# Patient Record
Sex: MALE | Race: WHITE | NOT HISPANIC OR LATINO
[De-identification: names, ages, dates, MRNs, and addresses within clinical notes are randomized per-mention and may not be internally consistent; named-entity substitution may affect disease eponyms.]

---

## 2017-06-08 ENCOUNTER — MEDICATION RENEWAL (OUTPATIENT)
Age: 52
End: 2017-06-08

## 2018-01-29 ENCOUNTER — APPOINTMENT (OUTPATIENT)
Dept: SURGERY | Facility: CLINIC | Age: 53
End: 2018-01-29
Payer: MEDICARE

## 2018-01-29 VITALS
HEART RATE: 85 BPM | HEIGHT: 67 IN | TEMPERATURE: 98 F | SYSTOLIC BLOOD PRESSURE: 123 MMHG | WEIGHT: 213 LBS | OXYGEN SATURATION: 95 % | RESPIRATION RATE: 18 BRPM | BODY MASS INDEX: 33.43 KG/M2 | DIASTOLIC BLOOD PRESSURE: 88 MMHG

## 2018-01-29 DIAGNOSIS — Z82.49 FAMILY HISTORY OF ISCHEMIC HEART DISEASE AND OTHER DISEASES OF THE CIRCULATORY SYSTEM: ICD-10-CM

## 2018-01-29 DIAGNOSIS — L72.0 EPIDERMAL CYST: ICD-10-CM

## 2018-01-29 PROCEDURE — 99203 OFFICE O/P NEW LOW 30 MIN: CPT

## 2019-07-08 ENCOUNTER — EMERGENCY (EMERGENCY)
Facility: HOSPITAL | Age: 54
LOS: 1 days | Discharge: HOME | End: 2019-07-08
Attending: EMERGENCY MEDICINE
Payer: MEDICARE

## 2019-07-08 VITALS
SYSTOLIC BLOOD PRESSURE: 138 MMHG | RESPIRATION RATE: 18 BRPM | HEART RATE: 71 BPM | OXYGEN SATURATION: 95 % | DIASTOLIC BLOOD PRESSURE: 88 MMHG | TEMPERATURE: 98 F | HEIGHT: 67 IN | WEIGHT: 210.1 LBS

## 2019-07-08 DIAGNOSIS — Z98.89 OTHER SPECIFIED POSTPROCEDURAL STATES: Chronic | ICD-10-CM

## 2019-07-08 LAB
ALBUMIN SERPL ELPH-MCNC: 4.1 G/DL — SIGNIFICANT CHANGE UP (ref 3.3–5.2)
ALP SERPL-CCNC: 73 U/L — SIGNIFICANT CHANGE UP (ref 40–120)
ALT FLD-CCNC: 45 U/L — HIGH
ANION GAP SERPL CALC-SCNC: 13 MMOL/L — SIGNIFICANT CHANGE UP (ref 5–17)
AST SERPL-CCNC: 28 U/L — SIGNIFICANT CHANGE UP
BASOPHILS # BLD AUTO: 0.07 K/UL — SIGNIFICANT CHANGE UP (ref 0–0.2)
BASOPHILS NFR BLD AUTO: 1 % — SIGNIFICANT CHANGE UP (ref 0–2)
BILIRUB SERPL-MCNC: 0.5 MG/DL — SIGNIFICANT CHANGE UP (ref 0.4–2)
BUN SERPL-MCNC: 14 MG/DL — SIGNIFICANT CHANGE UP (ref 8–20)
CALCIUM SERPL-MCNC: 9.6 MG/DL — SIGNIFICANT CHANGE UP (ref 8.6–10.2)
CHLORIDE SERPL-SCNC: 101 MMOL/L — SIGNIFICANT CHANGE UP (ref 98–107)
CO2 SERPL-SCNC: 22 MMOL/L — SIGNIFICANT CHANGE UP (ref 22–29)
CREAT SERPL-MCNC: 0.85 MG/DL — SIGNIFICANT CHANGE UP (ref 0.5–1.3)
EOSINOPHIL # BLD AUTO: 0.32 K/UL — SIGNIFICANT CHANGE UP (ref 0–0.5)
EOSINOPHIL NFR BLD AUTO: 4.5 % — SIGNIFICANT CHANGE UP (ref 0–6)
GLUCOSE SERPL-MCNC: 130 MG/DL — HIGH (ref 70–115)
HCT VFR BLD CALC: 45.9 % — SIGNIFICANT CHANGE UP (ref 39–50)
HGB BLD-MCNC: 16.3 G/DL — SIGNIFICANT CHANGE UP (ref 13–17)
IMM GRANULOCYTES NFR BLD AUTO: 0.3 % — SIGNIFICANT CHANGE UP (ref 0–1.5)
LIDOCAIN IGE QN: 13 U/L — LOW (ref 22–51)
LYMPHOCYTES # BLD AUTO: 2.05 K/UL — SIGNIFICANT CHANGE UP (ref 1–3.3)
LYMPHOCYTES # BLD AUTO: 28.6 % — SIGNIFICANT CHANGE UP (ref 13–44)
MCHC RBC-ENTMCNC: 34 PG — SIGNIFICANT CHANGE UP (ref 27–34)
MCHC RBC-ENTMCNC: 35.5 GM/DL — SIGNIFICANT CHANGE UP (ref 32–36)
MCV RBC AUTO: 95.8 FL — SIGNIFICANT CHANGE UP (ref 80–100)
MONOCYTES # BLD AUTO: 0.52 K/UL — SIGNIFICANT CHANGE UP (ref 0–0.9)
MONOCYTES NFR BLD AUTO: 7.3 % — SIGNIFICANT CHANGE UP (ref 2–14)
NEUTROPHILS # BLD AUTO: 4.19 K/UL — SIGNIFICANT CHANGE UP (ref 1.8–7.4)
NEUTROPHILS NFR BLD AUTO: 58.3 % — SIGNIFICANT CHANGE UP (ref 43–77)
PLATELET # BLD AUTO: 316 K/UL — SIGNIFICANT CHANGE UP (ref 150–400)
POTASSIUM SERPL-MCNC: 4 MMOL/L — SIGNIFICANT CHANGE UP (ref 3.5–5.3)
POTASSIUM SERPL-SCNC: 4 MMOL/L — SIGNIFICANT CHANGE UP (ref 3.5–5.3)
PROT SERPL-MCNC: 7.4 G/DL — SIGNIFICANT CHANGE UP (ref 6.6–8.7)
RBC # BLD: 4.79 M/UL — SIGNIFICANT CHANGE UP (ref 4.2–5.8)
RBC # FLD: 12.4 % — SIGNIFICANT CHANGE UP (ref 10.3–14.5)
SODIUM SERPL-SCNC: 136 MMOL/L — SIGNIFICANT CHANGE UP (ref 135–145)
WBC # BLD: 7.17 K/UL — SIGNIFICANT CHANGE UP (ref 3.8–10.5)
WBC # FLD AUTO: 7.17 K/UL — SIGNIFICANT CHANGE UP (ref 3.8–10.5)

## 2019-07-08 PROCEDURE — 96361 HYDRATE IV INFUSION ADD-ON: CPT

## 2019-07-08 PROCEDURE — 96374 THER/PROPH/DIAG INJ IV PUSH: CPT | Mod: XU

## 2019-07-08 PROCEDURE — 85027 COMPLETE CBC AUTOMATED: CPT

## 2019-07-08 PROCEDURE — 74177 CT ABD & PELVIS W/CONTRAST: CPT | Mod: 26

## 2019-07-08 PROCEDURE — 80053 COMPREHEN METABOLIC PANEL: CPT

## 2019-07-08 PROCEDURE — 74177 CT ABD & PELVIS W/CONTRAST: CPT

## 2019-07-08 PROCEDURE — 99284 EMERGENCY DEPT VISIT MOD MDM: CPT | Mod: 25

## 2019-07-08 PROCEDURE — 99285 EMERGENCY DEPT VISIT HI MDM: CPT

## 2019-07-08 PROCEDURE — 36415 COLL VENOUS BLD VENIPUNCTURE: CPT

## 2019-07-08 PROCEDURE — 99053 MED SERV 10PM-8AM 24 HR FAC: CPT

## 2019-07-08 PROCEDURE — 96375 TX/PRO/DX INJ NEW DRUG ADDON: CPT

## 2019-07-08 PROCEDURE — 83690 ASSAY OF LIPASE: CPT

## 2019-07-08 RX ORDER — SODIUM CHLORIDE 9 MG/ML
1000 INJECTION INTRAMUSCULAR; INTRAVENOUS; SUBCUTANEOUS ONCE
Refills: 0 | Status: COMPLETED | OUTPATIENT
Start: 2019-07-08 | End: 2019-07-08

## 2019-07-08 RX ORDER — MORPHINE SULFATE 50 MG/1
2 CAPSULE, EXTENDED RELEASE ORAL ONCE
Refills: 0 | Status: DISCONTINUED | OUTPATIENT
Start: 2019-07-08 | End: 2019-07-08

## 2019-07-08 RX ORDER — ONDANSETRON 8 MG/1
4 TABLET, FILM COATED ORAL ONCE
Refills: 0 | Status: COMPLETED | OUTPATIENT
Start: 2019-07-08 | End: 2019-07-08

## 2019-07-08 RX ADMIN — SODIUM CHLORIDE 1000 MILLILITER(S): 9 INJECTION INTRAMUSCULAR; INTRAVENOUS; SUBCUTANEOUS at 07:40

## 2019-07-08 RX ADMIN — ONDANSETRON 4 MILLIGRAM(S): 8 TABLET, FILM COATED ORAL at 07:40

## 2019-07-08 RX ADMIN — MORPHINE SULFATE 2 MILLIGRAM(S): 50 CAPSULE, EXTENDED RELEASE ORAL at 08:10

## 2019-07-08 RX ADMIN — MORPHINE SULFATE 2 MILLIGRAM(S): 50 CAPSULE, EXTENDED RELEASE ORAL at 07:40

## 2019-07-08 RX ADMIN — SODIUM CHLORIDE 1000 MILLILITER(S): 9 INJECTION INTRAMUSCULAR; INTRAVENOUS; SUBCUTANEOUS at 08:40

## 2019-07-08 NOTE — ED STATDOCS - PROGRESS NOTE DETAILS
PT evaluated by intake physician. HPI/PE/ROS as noted above. Will follow up plan per intake physician PA note: Results of CT reviewed " Epiploic appendagitis involving the distal descending colon". No other acute findings. NSAIDS for pain. Pt given follow up information for Dr. Pablo

## 2019-07-08 NOTE — ED STATDOCS - OBJECTIVE STATEMENT
54 y/o M pt with hx of pacemaker presents to ED c/o gradual onset worsening  left lower abdominal pain radiating to right side for the past 3 days. Pain worsens with positional changes and ambulation. Pt states irregular bowel movement; "taking longer to have one"; last movement a few days ago.  Last normal colonoscopy 2 months ago. Smoker, Occasional EtOH use.  Denies  fevers, chills, sore throat, cough, nausea, vomiting, urinary symptoms, chest pain,  new back pain. No further complaints at this time.

## 2019-07-08 NOTE — ED STATDOCS - CLINICAL SUMMARY MEDICAL DECISION MAKING FREE TEXT BOX
PT WITH SEVERE LLQ PAIN. SUDDEN ONSET ALMOST 10/10. PE TENDER SAME AREA + DISTRESS  IV FLUID AND MEDS, LABS UA CT  RESULTS SIG EPIPLOIC APPENDAGITIS  AGREE WITH TREATMENT PLAN

## 2019-07-08 NOTE — ED STATDOCS - ATTENDING CONTRIBUTION TO CARE
I, Aaliyah Hernandez, performed the initial face to face bedside interview with this patient regarding history of present illness, review of symptoms and relevant past medical, social and family history.  I completed an independent physical examination.  I was the initial provider who evaluated this patient. I have signed out the follow up of any pending tests (i.e. labs, radiological studies) to the ACP.  I have communicated the patient’s plan of care and disposition with the ACP.  The history, relevant review of systems, past medical and surgical history, medical decision making, and physical examination was documented by the scribe in my presence and I attest to the accuracy of the documentation.

## 2021-01-10 ENCOUNTER — INPATIENT (INPATIENT)
Facility: HOSPITAL | Age: 56
LOS: 2 days | Discharge: HOME | DRG: 504 | End: 2021-01-13
Attending: STUDENT IN AN ORGANIZED HEALTH CARE EDUCATION/TRAINING PROGRAM | Admitting: HOSPITALIST
Payer: MEDICARE

## 2021-01-10 ENCOUNTER — TRANSCRIPTION ENCOUNTER (OUTPATIENT)
Age: 56
End: 2021-01-10

## 2021-01-10 VITALS
TEMPERATURE: 98 F | OXYGEN SATURATION: 98 % | SYSTOLIC BLOOD PRESSURE: 136 MMHG | DIASTOLIC BLOOD PRESSURE: 78 MMHG | HEIGHT: 67 IN | RESPIRATION RATE: 18 BRPM | HEART RATE: 85 BPM

## 2021-01-10 DIAGNOSIS — Z98.89 OTHER SPECIFIED POSTPROCEDURAL STATES: Chronic | ICD-10-CM

## 2021-01-10 DIAGNOSIS — S92.215A NONDISPLACED FRACTURE OF CUBOID BONE OF LEFT FOOT, INITIAL ENCOUNTER FOR CLOSED FRACTURE: ICD-10-CM

## 2021-01-10 LAB
ALBUMIN SERPL ELPH-MCNC: 4.1 G/DL — SIGNIFICANT CHANGE UP (ref 3.3–5.2)
ALP SERPL-CCNC: 59 U/L — SIGNIFICANT CHANGE UP (ref 40–120)
ALT FLD-CCNC: 50 U/L — HIGH
ANION GAP SERPL CALC-SCNC: 16 MMOL/L — SIGNIFICANT CHANGE UP (ref 5–17)
APTT BLD: 27 SEC — LOW (ref 27.5–35.5)
AST SERPL-CCNC: 35 U/L — SIGNIFICANT CHANGE UP
BASOPHILS # BLD AUTO: 0.06 K/UL — SIGNIFICANT CHANGE UP (ref 0–0.2)
BASOPHILS NFR BLD AUTO: 0.6 % — SIGNIFICANT CHANGE UP (ref 0–2)
BILIRUB SERPL-MCNC: 0.8 MG/DL — SIGNIFICANT CHANGE UP (ref 0.4–2)
BLD GP AB SCN SERPL QL: SIGNIFICANT CHANGE UP
BUN SERPL-MCNC: 12 MG/DL — SIGNIFICANT CHANGE UP (ref 8–20)
CALCIUM SERPL-MCNC: 9 MG/DL — SIGNIFICANT CHANGE UP (ref 8.6–10.2)
CHLORIDE SERPL-SCNC: 101 MMOL/L — SIGNIFICANT CHANGE UP (ref 98–107)
CO2 SERPL-SCNC: 20 MMOL/L — LOW (ref 22–29)
CREAT SERPL-MCNC: 0.9 MG/DL — SIGNIFICANT CHANGE UP (ref 0.5–1.3)
EOSINOPHIL # BLD AUTO: 0.11 K/UL — SIGNIFICANT CHANGE UP (ref 0–0.5)
EOSINOPHIL NFR BLD AUTO: 1 % — SIGNIFICANT CHANGE UP (ref 0–6)
GLUCOSE SERPL-MCNC: 114 MG/DL — HIGH (ref 70–99)
HCT VFR BLD CALC: 43.6 % — SIGNIFICANT CHANGE UP (ref 39–50)
HGB BLD-MCNC: 15.7 G/DL — SIGNIFICANT CHANGE UP (ref 13–17)
IMM GRANULOCYTES NFR BLD AUTO: 0.4 % — SIGNIFICANT CHANGE UP (ref 0–1.5)
INR BLD: 1.16 RATIO — SIGNIFICANT CHANGE UP (ref 0.88–1.16)
LYMPHOCYTES # BLD AUTO: 2.73 K/UL — SIGNIFICANT CHANGE UP (ref 1–3.3)
LYMPHOCYTES # BLD AUTO: 25.9 % — SIGNIFICANT CHANGE UP (ref 13–44)
MCHC RBC-ENTMCNC: 34.7 PG — HIGH (ref 27–34)
MCHC RBC-ENTMCNC: 36 GM/DL — SIGNIFICANT CHANGE UP (ref 32–36)
MCV RBC AUTO: 96.5 FL — SIGNIFICANT CHANGE UP (ref 80–100)
MONOCYTES # BLD AUTO: 1.03 K/UL — HIGH (ref 0–0.9)
MONOCYTES NFR BLD AUTO: 9.8 % — SIGNIFICANT CHANGE UP (ref 2–14)
NEUTROPHILS # BLD AUTO: 6.57 K/UL — SIGNIFICANT CHANGE UP (ref 1.8–7.4)
NEUTROPHILS NFR BLD AUTO: 62.3 % — SIGNIFICANT CHANGE UP (ref 43–77)
PLATELET # BLD AUTO: 246 K/UL — SIGNIFICANT CHANGE UP (ref 150–400)
POTASSIUM SERPL-MCNC: 4.4 MMOL/L — SIGNIFICANT CHANGE UP (ref 3.5–5.3)
POTASSIUM SERPL-SCNC: 4.4 MMOL/L — SIGNIFICANT CHANGE UP (ref 3.5–5.3)
PROT SERPL-MCNC: 7.4 G/DL — SIGNIFICANT CHANGE UP (ref 6.6–8.7)
PROTHROM AB SERPL-ACNC: 13.4 SEC — SIGNIFICANT CHANGE UP (ref 10.6–13.6)
RBC # BLD: 4.52 M/UL — SIGNIFICANT CHANGE UP (ref 4.2–5.8)
RBC # FLD: 12.3 % — SIGNIFICANT CHANGE UP (ref 10.3–14.5)
SARS-COV-2 RNA SPEC QL NAA+PROBE: SIGNIFICANT CHANGE UP
SODIUM SERPL-SCNC: 137 MMOL/L — SIGNIFICANT CHANGE UP (ref 135–145)
WBC # BLD: 10.54 K/UL — HIGH (ref 3.8–10.5)
WBC # FLD AUTO: 10.54 K/UL — HIGH (ref 3.8–10.5)

## 2021-01-10 PROCEDURE — 99053 MED SERV 10PM-8AM 24 HR FAC: CPT

## 2021-01-10 PROCEDURE — 70450 CT HEAD/BRAIN W/O DYE: CPT | Mod: 26

## 2021-01-10 PROCEDURE — 73700 CT LOWER EXTREMITY W/O DYE: CPT | Mod: 26,LT

## 2021-01-10 PROCEDURE — 73630 X-RAY EXAM OF FOOT: CPT | Mod: 26,LT

## 2021-01-10 PROCEDURE — 73610 X-RAY EXAM OF ANKLE: CPT | Mod: 26,LT

## 2021-01-10 PROCEDURE — 93010 ELECTROCARDIOGRAM REPORT: CPT

## 2021-01-10 PROCEDURE — 99223 1ST HOSP IP/OBS HIGH 75: CPT

## 2021-01-10 PROCEDURE — 71046 X-RAY EXAM CHEST 2 VIEWS: CPT | Mod: 26

## 2021-01-10 PROCEDURE — 99285 EMERGENCY DEPT VISIT HI MDM: CPT

## 2021-01-10 RX ORDER — OXYCODONE AND ACETAMINOPHEN 5; 325 MG/1; MG/1
1 TABLET ORAL ONCE
Refills: 0 | Status: DISCONTINUED | OUTPATIENT
Start: 2021-01-10 | End: 2021-01-10

## 2021-01-10 RX ORDER — MORPHINE SULFATE 50 MG/1
4 CAPSULE, EXTENDED RELEASE ORAL
Refills: 0 | Status: DISCONTINUED | OUTPATIENT
Start: 2021-01-10 | End: 2021-01-11

## 2021-01-10 RX ORDER — MORPHINE SULFATE 50 MG/1
6 CAPSULE, EXTENDED RELEASE ORAL ONCE
Refills: 0 | Status: DISCONTINUED | OUTPATIENT
Start: 2021-01-10 | End: 2021-01-10

## 2021-01-10 RX ORDER — LANOLIN ALCOHOL/MO/W.PET/CERES
3 CREAM (GRAM) TOPICAL AT BEDTIME
Refills: 0 | Status: COMPLETED | OUTPATIENT
Start: 2021-01-10 | End: 2021-01-10

## 2021-01-10 RX ORDER — ACETAMINOPHEN 500 MG
650 TABLET ORAL EVERY 6 HOURS
Refills: 0 | Status: DISCONTINUED | OUTPATIENT
Start: 2021-01-10 | End: 2021-01-11

## 2021-01-10 RX ORDER — AMIODARONE HYDROCHLORIDE 400 MG/1
200 TABLET ORAL DAILY
Refills: 0 | Status: DISCONTINUED | OUTPATIENT
Start: 2021-01-10 | End: 2021-01-10

## 2021-01-10 RX ORDER — MORPHINE SULFATE 50 MG/1
6 CAPSULE, EXTENDED RELEASE ORAL
Refills: 0 | Status: DISCONTINUED | OUTPATIENT
Start: 2021-01-10 | End: 2021-01-11

## 2021-01-10 RX ADMIN — Medication 3 MILLIGRAM(S): at 21:14

## 2021-01-10 RX ADMIN — MORPHINE SULFATE 6 MILLIGRAM(S): 50 CAPSULE, EXTENDED RELEASE ORAL at 11:51

## 2021-01-10 RX ADMIN — MORPHINE SULFATE 6 MILLIGRAM(S): 50 CAPSULE, EXTENDED RELEASE ORAL at 17:19

## 2021-01-10 RX ADMIN — MORPHINE SULFATE 6 MILLIGRAM(S): 50 CAPSULE, EXTENDED RELEASE ORAL at 14:16

## 2021-01-10 RX ADMIN — MORPHINE SULFATE 6 MILLIGRAM(S): 50 CAPSULE, EXTENDED RELEASE ORAL at 20:24

## 2021-01-10 RX ADMIN — OXYCODONE AND ACETAMINOPHEN 1 TABLET(S): 5; 325 TABLET ORAL at 08:35

## 2021-01-10 RX ADMIN — MORPHINE SULFATE 4 MILLIGRAM(S): 50 CAPSULE, EXTENDED RELEASE ORAL at 23:50

## 2021-01-10 NOTE — CONSULT NOTE ADULT - SUBJECTIVE AND OBJECTIVE BOX
Patient is a 55y old  Male who presents with a chief complaint of foot fracture (10 Vitaliy 2021 12:46)      HPI:  Patient is a 56 y/o M with pmh of afib on eliquis s/p ppm, NICM, bicuspid aortic valve, obesity presents s/p fall. Patient states he was drinking alcohol during the football game and tripped and fell down the stairs. Patient denies any prior chest pain, palpitations or LOC. Patient presented to the ed and found to have multuiple left foot fractures and podiatry was consulted. Patient seen at bedside and complains of pain but denies any other complaints (10 Vitaliy 2021 12:05)      Podiatry HPI: History as above.      PMH:GERD (gastroesophageal reflux disease)  CHELITA (obstructive sleep apnea)  Afib      Allergies: No Known Allergies    Medications: acetaminophen   Tablet .. 650 milliGRAM(s) Oral every 6 hours PRN  morphine  - Injectable 4 milliGRAM(s) IV Push every 3 hours PRN  morphine  - Injectable 6 milliGRAM(s) IV Push every 3 hours PRN    FH:FH: hypertension      PSX: S/P ablation of atrial fibrillation      SH: Social History:  denies drugs  social drinker (10 Vitaliy 2021 12:05)      Vital Signs Last 24 Hrs  T(C): 36.8 (10 Vitaliy 2021 07:12), Max: 36.8 (10 Vitaliy 2021 07:12)  T(F): 98.2 (10 Vitaliy 2021 07:12), Max: 98.2 (10 Vitaliy 2021 07:12)  HR: 85 (10 Vitaliy 2021 07:12) (85 - 85)  BP: 136/78 (10 Vitaliy 2021 07:12) (136/78 - 136/78)  BP(mean): --  RR: 18 (10 Vitaliy 2021 07:12) (18 - 18)  SpO2: 98% (10 Vitaliy 2021 07:12) (98% - 98%)    LABS                        15.7   10.54 )-----------( 246      ( 10 Vitaliy 2021 11:49 )             43.6               01-10    137  |  101  |  12.0  ----------------------------<  114<H>  4.4   |  20.0<L>  |  0.90    Ca    9.0      10 Vitaliy 2021 11:49    TPro  7.4  /  Alb  4.1  /  TBili  0.8  /  DBili  x   /  AST  35  /  ALT  50<H>  /  AlkPhos  59  01-10     WBC Count: 10.54 K/uL (01-10-21 @ 11:49)      ROS  REVIEW OF SYSTEMS:    CONSTITUTIONAL: No fever, weight loss, or fatigue  EYES: No eye pain, visual disturbances, or discharge  ENMT:  No difficulty hearing, tinnitus, vertigo; No sinus or throat pain  NECK: No pain or stiffness  BREASTS: No pain, masses, or nipple discharge  RESPIRATORY: No cough, wheezing, chills or hemoptysis; No shortness of breath  CARDIOVASCULAR: No chest pain, palpitations, dizziness, or leg swelling  GASTROINTESTINAL: No abdominal or epigastric pain. No nausea, vomiting, or hematemesis; No diarrhea or constipation. No melena or hematochezia.  GENITOURINARY: No dysuria, frequency, hematuria, or incontinence  NEUROLOGICAL: No headaches, memory loss, loss of strength, numbness, or tremors  SKIN: ecchymosis left plantar foot  LYMPH NODES: No enlarged glands  ENDOCRINE: No heat or cold intolerance; No hair loss  MUSCULOSKELETAL: left foot pain  PSYCHIATRIC: No depression, anxiety, mood swings, or difficulty sleeping  HEME/LYMPH: No easy bruising, or bleeding gums  ALLERY AND IMMUNOLOGIC: No hives or eczema      PHYSICAL EXAM  GEN: WASHINGTON RICHMOND is a pleasant well-nourished, well developed 55y Male in no acute distress, alert awake, and oriented to person, place and time.   LE Focused:    Vasc:  DP and PT pulses +2/4 b/l.  CFT brisk to digits. feet warm to touch.    Derm: No open lesions.   Ecchymosis to plantar forefoot.    Neuro: Protective sensation intact.  MSK: Diffuse pain on palpation of forefoot, midfoot, and rearfoot secondary to multiple fractures.  Patient can actively move digits.  No pain with ROM of digits 2-4.  Achilles tendon intact.      Imaging:       `< from: CT Foot No Cont, Left (01.10.21 @ 09:35) >   EXAM:  CT FOOT ONLY LT                        PROCEDURE DATE:  01/10/2021    INTERPRETATION:  EXAMINATION: CT left foot without contrast    CLINICAL INFORMATION: Severe left foot pain    TECHNIQUE: Axial CT images were obtained through theleft foot. Coronal and sagittal reformatted images were made. 3-D reconstruction images were also performed.    FINDINGS: There are multiple acute fractures at the midfoot. This includes: Comminuted minimally displaced fracture at the lateral aspect of the medial cuneiform at the proximal attachment of the Lisfranc ligament with extension to the distal articular surface of the medial cuneiform. Comminuted impacted fracture of the lateral aspect of the base of the first metatarsal. Obliquely oriented minimally displaced and comminuted fracture of the base of the second metatarsal extending to its proximal shaft. Minimally displaced avulsion fracture at the distal dorsal aspect of the lateral cuneiform. Mildly comminuted obliquely oriented fracture of the dorsal lateral aspect of the cuboid with up to 0.5 cm of dorsal lateral displacement of the dominant dorsal fracture fragment. Displaced at the medial aspect of the base of the fourth metatarsal.    There is also a mildly displaced avulsion fracture of the anterior aspect of the distal fibula at the attachment of the anterior inferior tibiofibular ligament and there is a nondisplaced coronally oriented fracture of the posterior malleolus.    There is dorsal subcutaneous soft tissue edema and lateral subcutaneous edema at the ankle. There are no advanced arthritic changes.    The 3-D reconstructed images confirm the above fractures.    IMPRESSION: Multiple acute fractures at the midfoot including fractures of the medial cuneiform, lateral cuneiform, cuboid, and bases of the first, second, and fourth metatarsals, as above.    Nondisplaced coronally oriented fracture of the posterior malleolus. Cortical avulsion fracture of the anterior aspect of the distal fibula.  WASHINGTON MATA MD; Attending Radiologist  This document has been electronically signed. Vitaliy 10 2021 10:10AM    < end of copied text >          INTERPRETATION:  Clinical Information: Left foot pain, unable to bear weight    Technique: 3 views left foot. 3 views left ankle.    Comparison: None    Findings/  Impression: There is no acute fracture or dislocation. Mild cortical irregularity involving the medial aspect of the first MTP joint, likely mild arthropathy. Severe dorsal foot soft tissue swelling. Consider cross-sectional imaging if symptoms persist.            RASHI HORTON MD; Attending Interventional Radiologist  This document has been electronically signed. Vitaliy 10 2021  8:35AM    < end of copied text >        A: Left     P:  Patient evaluated, chart reviewed  Xrays reviewed  CT reviewed- lisfranc fracture, cuboid fracture,   Applied multilayer compressive dressing  Plan for ORIF of lisfranc and cuboid fractures on admission  Will need to bridge or hold eliqis prior to surgery   Please assess for medical clearance  Will follow up  Discussed with Attending Dr. Garland       Patient is a 55y old  Male who presents with a chief complaint of foot fracture (10 Vitaliy 2021 12:46)      HPI:  Patient is a 54 y/o M with pmh of afib on eliquis s/p ppm, NICM, bicuspid aortic valve, obesity presents s/p fall. Patient states he was drinking alcohol during the football game and tripped and fell down the stairs. Patient denies any prior chest pain, palpitations or LOC. Patient presented to the ed and found to have multuiple left foot fractures and podiatry was consulted. Patient seen at bedside and complains of pain but denies any other complaints (10 Vitaliy 2021 12:05)      Podiatry HPI: History as above.      PMH:GERD (gastroesophageal reflux disease)  CHELITA (obstructive sleep apnea)  Afib      Allergies: No Known Allergies    Medications: acetaminophen   Tablet .. 650 milliGRAM(s) Oral every 6 hours PRN  morphine  - Injectable 4 milliGRAM(s) IV Push every 3 hours PRN  morphine  - Injectable 6 milliGRAM(s) IV Push every 3 hours PRN    FH:FH: hypertension      PSX: S/P ablation of atrial fibrillation      SH: Social History:  denies drugs  social drinker (10 Vitaliy 2021 12:05)      Vital Signs Last 24 Hrs  T(C): 36.8 (10 Vitaliy 2021 07:12), Max: 36.8 (10 Vitaliy 2021 07:12)  T(F): 98.2 (10 Vitaliy 2021 07:12), Max: 98.2 (10 Vitaliy 2021 07:12)  HR: 85 (10 Vitaliy 2021 07:12) (85 - 85)  BP: 136/78 (10 Vitaliy 2021 07:12) (136/78 - 136/78)  BP(mean): --  RR: 18 (10 Vitaliy 2021 07:12) (18 - 18)  SpO2: 98% (10 Vitaliy 2021 07:12) (98% - 98%)    LABS                        15.7   10.54 )-----------( 246      ( 10 Vitaliy 2021 11:49 )             43.6               01-10    137  |  101  |  12.0  ----------------------------<  114<H>  4.4   |  20.0<L>  |  0.90    Ca    9.0      10 Vitaliy 2021 11:49    TPro  7.4  /  Alb  4.1  /  TBili  0.8  /  DBili  x   /  AST  35  /  ALT  50<H>  /  AlkPhos  59  01-10     WBC Count: 10.54 K/uL (01-10-21 @ 11:49)      ROS  REVIEW OF SYSTEMS:    CONSTITUTIONAL: No fever, weight loss, or fatigue  EYES: No eye pain, visual disturbances, or discharge  ENMT:  No difficulty hearing, tinnitus, vertigo; No sinus or throat pain  NECK: No pain or stiffness  BREASTS: No pain, masses, or nipple discharge  RESPIRATORY: No cough, wheezing, chills or hemoptysis; No shortness of breath  CARDIOVASCULAR: No chest pain, palpitations, dizziness, or leg swelling  GASTROINTESTINAL: No abdominal or epigastric pain. No nausea, vomiting, or hematemesis; No diarrhea or constipation. No melena or hematochezia.  GENITOURINARY: No dysuria, frequency, hematuria, or incontinence  NEUROLOGICAL: No headaches, memory loss, loss of strength, numbness, or tremors  SKIN: ecchymosis left plantar foot  LYMPH NODES: No enlarged glands  ENDOCRINE: No heat or cold intolerance; No hair loss  MUSCULOSKELETAL: left foot pain  PSYCHIATRIC: No depression, anxiety, mood swings, or difficulty sleeping  HEME/LYMPH: No easy bruising, or bleeding gums  ALLERY AND IMMUNOLOGIC: No hives or eczema      PHYSICAL EXAM  GEN: WASHINGTON RICHMOND is a pleasant well-nourished, well developed 55y Male in no acute distress, alert awake, and oriented to person, place and time.   LE Focused:    Vasc:  DP and PT pulses +2/4 b/l.  CFT brisk to digits. feet warm to touch.    Derm: No open lesions.   Ecchymosis to plantar forefoot.    Neuro: Protective sensation intact.  MSK: Diffuse pain on palpation of forefoot, midfoot, and rearfoot secondary to multiple fractures.  Patient can actively move digits.  No pain with ROM of digits 2-4.  Achilles tendon intact.      Imaging:       `< from: CT Foot No Cont, Left (01.10.21 @ 09:35) >   EXAM:  CT FOOT ONLY LT                        PROCEDURE DATE:  01/10/2021    INTERPRETATION:  EXAMINATION: CT left foot without contrast    CLINICAL INFORMATION: Severe left foot pain    TECHNIQUE: Axial CT images were obtained through theleft foot. Coronal and sagittal reformatted images were made. 3-D reconstruction images were also performed.    FINDINGS: There are multiple acute fractures at the midfoot. This includes: Comminuted minimally displaced fracture at the lateral aspect of the medial cuneiform at the proximal attachment of the Lisfranc ligament with extension to the distal articular surface of the medial cuneiform. Comminuted impacted fracture of the lateral aspect of the base of the first metatarsal. Obliquely oriented minimally displaced and comminuted fracture of the base of the second metatarsal extending to its proximal shaft. Minimally displaced avulsion fracture at the distal dorsal aspect of the lateral cuneiform. Mildly comminuted obliquely oriented fracture of the dorsal lateral aspect of the cuboid with up to 0.5 cm of dorsal lateral displacement of the dominant dorsal fracture fragment. Displaced at the medial aspect of the base of the fourth metatarsal.    There is also a mildly displaced avulsion fracture of the anterior aspect of the distal fibula at the attachment of the anterior inferior tibiofibular ligament and there is a nondisplaced coronally oriented fracture of the posterior malleolus.    There is dorsal subcutaneous soft tissue edema and lateral subcutaneous edema at the ankle. There are no advanced arthritic changes.    The 3-D reconstructed images confirm the above fractures.    IMPRESSION: Multiple acute fractures at the midfoot including fractures of the medial cuneiform, lateral cuneiform, cuboid, and bases of the first, second, and fourth metatarsals, as above.    Nondisplaced coronally oriented fracture of the posterior malleolus. Cortical avulsion fracture of the anterior aspect of the distal fibula.  WASHINGTON MATA MD; Attending Radiologist  This document has been electronically signed. Vitaliy 10 2021 10:10AM    < end of copied text >          INTERPRETATION:  Clinical Information: Left foot pain, unable to bear weight    Technique: 3 views left foot. 3 views left ankle.    Comparison: None    Findings/  Impression: There is no acute fracture or dislocation. Mild cortical irregularity involving the medial aspect of the first MTP joint, likely mild arthropathy. Severe dorsal foot soft tissue swelling. Consider cross-sectional imaging if symptoms persist.            RASHI HORTON MD; Attending Interventional Radiologist  This document has been electronically signed. Vitaliy 10 2021  8:35AM    < end of copied text >        A: Left lisfranc fracture dislocation,   left cuboid fracture    P:  Patient evaluated, chart reviewed  Xrays reviewed  CT reviewed- lisfranc fracture, cuboid fracture,   Applied multilayer compressive dressing  Plan for ORIF of lisfranc and cuboid fractures on admission  Will need to bridge or hold eliqis prior to surgery   Please assess for medical clearance  Plan for ORIF of left lisfranc, left cuboid on 1/11 at 6PM.   NPO after midnight  Recommend strict left leg elevation until surgery  NWB to left foot  Discussed signs of compartment syndrome with the patient, and that patient does not have any signs of compartment syndrome as he can move his digits, has pulses, has sensation, warmth equal to contralateral limb..   Discussed with Attending Dr. Garland

## 2021-01-10 NOTE — ED PROVIDER NOTE - SECONDARY DIAGNOSIS.
Fracture of tibia and fibula, left, closed, initial encounter Closed nondisplaced fracture of first metatarsal bone of left foot, initial encounter

## 2021-01-10 NOTE — H&P ADULT - NSICDXPASTMEDICALHX_GEN_ALL_CORE_FT
PAST MEDICAL HISTORY:  Afib     GERD (gastroesophageal reflux disease)     CHELITA (obstructive sleep apnea)

## 2021-01-10 NOTE — CONSULT NOTE ADULT - ASSESSMENT
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 Has not followed w/Dr. Khan in 4+yrs. PMD Dr. Abebe. Reports good functional capacity, goes to gym. Patient presented to the ED c/o left foot pain s/p mechanical fall down stairs last night while drinking during football game, denies LOC or head trauma.  Per note has multiple foot fractures requiring surgical intervention. Denies exertional chest pain/pressure, palpitations, irregular and/or rapid heart beat, SOB, VASQUEZ, syncope/near syncope, dizziness, orthopnea, PND, cough, edema, f/c, n/v/d, hematuria, or hematochezia.       NICM  -TTE pending    Hx pAFib/AFlutter, Tachy Roslyn syndrome  -EKG pending  -s/p MDT Dual chamber avisa placed 6/27/16  -on Eliquis, may hold for surgical procedure and resume as soon as deemed safe by surgical team    Pre-operative cardiovascular risk evaluation and management.   -No cardiac symptoms.   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 Has not followed w/Dr. Khan in 4+yrs. PMD Dr. Abebe. Reports good functional capacity, goes to gym. Patient presented to the ED c/o left foot pain s/p mechanical fall down stairs last night while drinking during football game, denies LOC or head trauma.  Per note has multiple foot fractures requiring surgical intervention. Denies exertional chest pain/pressure, palpitations, irregular and/or rapid heart beat, SOB, VASQUEZ, syncope/near syncope, dizziness, orthopnea, PND, cough, edema, f/c, n/v/d, hematuria, or hematochezia.       NICM  -TTE pending    Hx pAFib/AFlutter, Tachy Roslyn syndrome  -EKG pending  -s/p MDT Dual chamber avisa placed 6/27/16  -on Eliquis, may hold for surgical procedure and resume as soon as deemed safe by surgical team    Pre-operative cardiovascular risk evaluation and management.   -No cardiac symptoms.   -EKG and TTE pending      Assessment and recommendations are final when note is signed by the attending.

## 2021-01-10 NOTE — ED PROVIDER NOTE - PROGRESS NOTE DETAILS
NP NOTE:  x-rays no fx, patient was still in severe pain Ct ordered.  acute fx of medial and lateral cuneiform bone and cuboid bone, bases of 1st, 2nd, and 4th metatarsals, and posterior malleolus fx, avulsion fx distal fibula.  I s/w Dr Garland of podiatry, he recommends admission to monitor for compartment syndrome.  2 + DP pulse maintained. Pre-op labs ordered, Dr OFE Mills given report and care,

## 2021-01-10 NOTE — H&P ADULT - ASSESSMENT
55 yom with pmh of afib on eliquis, s/p ppm , CHELITA presents from home with fall and found to have a foot fracture and is rule out compartment syndrome as per podiatry.     #foot fracture - ER consulted podiatry   --> will order echo  --> HOLD eliquis , given possibility of surgery   --> cardio consulted     #afib - hold eliquis  - home amio   rate controlled    #pain - iv morphine prn     #diet- npo for now until cleared by podiatry     #dvt prop - hold  55 yom with pmh of afib on eliquis, s/p ppm , CHELITA presents from home with fall and found to have a foot fracture and is rule out compartment syndrome as per podiatry.     #foot fracture - ER consulted podiatry   --> will order echo  --> HOLD eliquis , given possibility of surgery   --> cardio consulted     #afib - hold eliquis  rate controlled    #pain - iv morphine prn     #diet- npo for now until cleared by podiatry     #dvt prop - hold  55 yom with pmh of afib on eliquis, s/p ppm , CHELITA presents from home with fall and found to have a foot fracture and is rule out compartment syndrome as per podiatry.     #foot fracture - ER consulted podiatry   --> will order echo  --> HOLD eliquis , given possibility of surgery   --> cardio consulted     #afib - hold eliquis  rate controlled    #pain - iv morphine prn     #diet- npo for now until cleared by podiatry     #dvt prop - hold eliquis    spoke to podiatry , tentative plan is or tuesday  npo pmn tomorrow  --> will give medical clearance upon result of tte

## 2021-01-10 NOTE — ED PROVIDER NOTE - OBJECTIVE STATEMENT
56 y/o M was drinking beer last night, states he fell down 12 stairs.  Denies hitting head or LOC.  He presents with c/o left foot pain, and inability bearing weight.   Has not taken anything for the pain. Denies neck or back pain.  He is on Eliquis for a-fib.

## 2021-01-10 NOTE — CONSULT NOTE ADULT - REASON FOR ADMISSION
History and Physical (focused and Comprehensive)    Shannon Garg  1953    Chief Complaint (with details of present illness): Painless, progressive loss of vision consistent with combined  cataract right eye. Patient complains of decreased vision and glare, and desires visual improvement presenting for cataract surgery.    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   • Anemia    • Anxiety    • Arthritis     NECK, KNEES, SHOULDERS   • Atrial fibrillation    • Bronchitis    • Calculus of kidney 8/6/2015   • Cataracts, both eyes    • Chronic pain syndrome    • Cirrhosis    • CKD (chronic kidney disease), stage III (CMS/[facility])    • Congestive cardiac failure    • Depressive disorder    • Diabetes mellitus 2000    checks blood sugars 3 times a day   • Diabetic neuropathy 2014   • Diabetic polyneuropathy associated with diabetes mellitus due to underlying condition (CMS/[facility])    • Essential (primary) hypertension    • Glaucoma suspect     right eye   • History of alcohol abuse    • Hyperlipidemia    • Hypokalemia    • MRSA (methicillin resistant Staphylococcus aureus) 08/07/2018   • Obesity    • Pneumonia     LLL   • Pyelonephritis    • RLS (restless legs syndrome)    • Secondary hyperaldosteronism    • Sleep apnea     CPAP   • Thrombocytopenia (CMS/[facility])    • Urinary incontinence    • Urinary tract infection        Surgeries:  Past Surgical History:   Procedure Laterality Date   • Appendectomy     • Cataract extraction w/ intraocular lens implant Left 09/12/2019    Dr. Marquez   • Central line insertion Right 05/26/2016   • Cholecystectomy     • Colonoscopy w/ polypectomy  01/01/2004   • Cystourethroscopy w/rem calcul      Multiple   • Hysterectomy  2011   • Tubal ligation         Prior to Admission medications    Medication Sig Start Date End Date Taking? Authorizing Provider   warfarin (COUMADIN) 1 MG tablet Take 1 mg by mouth daily. Taking 1 
1/2 tabs (1.5 mg) on mon/tues/ wed/ fri/ and sat   Yes Outside Provider   dextrose (GLUTOSE) 40 % Gel Take 15 g by mouth.    Outside Provider   Insulin Syringes, Disposable, U-100 0.5 ML Misc  3/28/12   Outside Provider   gabapentin (NEURONTIN) 300 MG capsule Take 1 capsule (300 mg) every morning. Take 1 capsule every evening along with 600 mg tablet total dose 900 mg in the evening 9/13/19   Stan Correa MD   warfarin (COUMADIN) 2 MG tablet Take 2 mg by mouth as directed. Taking one tab on Thursday and sunday 9/13/19   Stan Correa MD   warfarin (COUMADIN) 3 MG tablet Take 1 tablet by mouth as directed. 9/13/19   Stan Correa MD   HYDROcodone-acetaminophen (NORCO) 5-325 MG per tablet 1 QD PRN SEVERE BACK PAIN 9/11/19   Stan Correa MD   ipratropium (ATROVENT) 0.03 % nasal spray Spray 2 sprays in each nostril 3 times daily.    Outside Provider   pravastatin (PRAVACHOL) 20 MG tablet Take 1 tablet by mouth daily. 9/6/19   Carlitos Hernandez MD   benzonatate (TESSALON PERLES) 100 MG capsule Take 1 capsule by mouth 3 times daily as needed for Cough. 9/2/19   Stan Correa MD   insulin lispro (HUMALOG KWIKPEN) 100 UNIT/ML pen-injector 12 units before breakfast and lunch + Sliding scale TID AC: 0-125, 0u. For every 25 over 125, give 2 units. 8/30/19   Bob Wellington MD   insulin glargine (LANTUS) 100 UNIT/ML injectable solution 30u qd- higher dose  Patient taking differently: nightly. 30u qd- higher dose 8/14/19   Stan Correa MD   prednisoLONE acetate (PRED FORTE, OMNIPRED) 1 % ophthalmic suspension Place 1 drop into right eye 3 times daily. Begin after surgery as instructed, bring drops with you to surgery, and continue until gone. 8/5/19   Emily Marquez MD   moxifloxacin (VIGAMOX) 0.5 % ophthalmic solution Place 1 drop into right eye 3 times daily. Start drops 3 days prior to surgery. Bring drops to surgery and continue with drops until gone. 8/5/19   Emily Marquez MD   lisinopril (ZESTRIL) 5 MG tablet Take 0.5 
tablets by mouth every morning. 8/2/19   Stan Correa MD   Magnesium Oxide 400 (240 Mg) MG Tab Take 400 mg by mouth daily. 7/28/19   Stan Correa MD   furosemide (LASIX) 40 MG tablet 60 QAM AND 40 PM- HIGHER DOSE 5/17 7/23/19   Stan Correa MD   loratadine (CLARITIN) 10 MG tablet Take 1 tablet by mouth daily. 7/23/19   Stan Correa MD   ARIPiprazole (ABILIFY) 10 MG tablet Records from care facility states 10mg per day. 7/19/19   Stan Correa MD   magnesium hydroxide (MILK OF MAGNESIA) 400 MG/5ML suspension Take 30 mLs by mouth 2 times daily as needed for Constipation. 7/18/19   Stan Correa MD   aspirin 81 MG chewable tablet Chew 1 tablet by mouth daily. 7/18/19   Stan Correa MD   folic acid (FOLATE) 400 MCG tablet Take 2 tablets by mouth daily. 7/18/19   Stan Correa MD   ferrous sulfate 325 (65 FE) MG tablet Take 1 tablet by mouth daily (with breakfast). 7/18/19   Stan Correa MD   nystatin (MYCOSTATIN) 223467 UNIT/GM powder APPLY BID PRN RASH 6/25/19   Stan Correa MD   guaiFENesin (MUCINEX) 600 MG 12 hr tablet Take 1 tablet by mouth 2 times daily as needed for Congestion. 6/3/19   Stan Correa MD   Pectin (THROAT DROPS) 2 MG Lozenge 1 Q1 HR PRN 5/23/19   Stan Correa MD   carvedilol (COREG) 12.5 MG tablet 1 bid- lower dose 4/5/19 5/21/19   Stan Correa MD   Multiple Vitamins-Minerals (THERA-M) Tab Take 1 tablet by mouth daily. 2/8/19   Stan Correa MD   tiZANidine (ZANAFLEX) 4 MG tablet 1 tab po qhs 1/30/19   Stan Correa MD   escitalopram (LEXAPRO) 10 MG tablet Take 1 tablet by mouth daily. 1/30/19   Stan Correa MD   loperamide (IMODIUM) 2 MG capsule Take 1 capsule by mouth 4 times daily as needed for Diarrhea. 1/24/19   Stan Correa MD   omeprazole (PRILOSEC) 40 MG capsule Take 1 capsule by mouth daily. 1/23/19   Stan Correa MD   ropinirole (REQUIP) 4 MG tablet 1 BID- HIGHER DOSE 1/23/19   Stan Correa MD   gabapentin (NEURONTIN) 600 MG tablet Take 1 tablet nightly along with 300 mg capsule total dose 900 
foot fracture
mg every night 1/23/19   Stan Correa MD   fluticasone (FLONASE) 50 MCG/ACT nasal spray 2 sprays per nostril qhs for 2 weeks then prn congestion 1/21/19   Stan Correa MD   DISPENSE Hospital bed 1/10/19   Stan Correa MD   blood glucose test strip One Touch Verio Test strips- use to check blood sugars 3 times daily.  Dx. Code E11.49 1/10/19   Stan Correa MD   [facility]. Devices [facility] BED. WT  111kg.  FTF- 1/3/19.   KIRA- 99.   DX- CHF 1/3/19   Stan Correa MD   bisacodyl (DULCOLAX) 10 MG suppository Place 10 mg rectally daily as needed for Constipation.    Outside Provider   sodium biphosphate (FLEET) 7-19 GM/118ML enema Place 1 enema rectally daily as needed.     Outside Provider   polyethylene glycol (MIRALAX) powder Take 17 g by mouth daily as needed.    Outside Provider   Lancets 28G Misc To Use as directed to check blood sugars 2-3 times a day for Diabetes Type 2 uncontrolled insulin dependent E 11.49 9/14/18   Stan Correa MD   acetaminophen (TYLENOL) 325 MG tablet Take 2 tablets by mouth every 4 hours as needed for Pain. 4/23/18   Stan Correa MD   [facility]. Devices [facility] BED. WT  260 LBS.  FTF- 4/09/18.   KIRA- 99.   DX- CHF 4/13/18   Stan Correa MD   nitroGLYcerin (NITROSTAT) 0.4 MG sublingual tablet Place 1 tablet under the tongue every 5 minutes as needed for Chest pain. 4/4/18   Devante Aguirre MD   ondansetron (ZOFRAN ODT) 4 MG disintegrating tablet Place 1 tablet onto the tongue every 8 hours as needed for Nausea. 3/19/18   GRACE Marin   DISPENSE Adult pull ups. Use as directed as needed for incontinence. 7/11/17   Stan Correa MD   DISPENSE Glucometer of choice to check blood sugars 2-3 times a day- diabetes type 2 uncontrolled insulin dependant E 11.49 1/19/16   Stan Correa MD   DISPENSE Glucometer test strips of choice to check blood sugars 2-3 times a day diabetes type 2 uncontrolled insulin dependent E 11.49 1/19/16   Stan Correa MD       ALLERGIES:   Allergen Reactions   • Actos 
[Pioglitazone] SWELLING   • Demerol Other (See Comments)     Unsure of reaction   • Nabumetone Other (See Comments)     Unsure of reaction   • Nitrofurazone Other (See Comments)     Unsure of reaction   • Penicillins Muscle Spasm     Tolerated cefepime w/o ADR 9/2017.       There were no vitals taken for this visit.    Physical Review of Systems:  Heent:  RIGHT eye: VA: 20/ 40    SPH: 20/40  BAT: 20/400  Cornea: clear  Pupil: well dilated   Lens: 2~3+ Nuclear sclerosis, tr cortical changes  Macula: normal  Teeth: deferred  Neck: deferred  Heart: Regular Rate Rhythm             S1 S2 without murmur  Lungs: clear to auscultation  Abdomen: deferred  Back: deferred  Extremities: deferred  Genitalia: deferred                 Comprehensive: N/A  Family History: Non contributory  Social History: Non contributory    Impression:  Visually significant cataract right eye, combined cataract,     Risks, benefits and alternatives discussed with the patient.   Planned course of action:  Cataract extraction with IOL right eye,   Specific lens type reviewed and verified with patient.  Patient will have Standard IOL.    Emily Marquez MD  9/24/2019                
foot fracture

## 2021-01-10 NOTE — H&P ADULT - NSHPLABSRESULTS_GEN_ALL_CORE
15.7   10.54  )----------(  246       ( 10 Vitaliy 2021 11:49 )               43.6      137    |  101    |  12.0   ----------------------------<  114        ( 10 Vitaliy 2021 11:49 )  4.4     |  20.0   |  0.90     Ca    9.0        ( 10 Vitaliy 2021 11:49 )    TPro  7.4    /  Alb  4.1    /  TBili  0.8    /  DBili  x      /  AST  35     /  ALT  50     /  AlkPhos  59     ( 10 Vitaliy 2021 11:49 )    LIVER FUNCTIONS - ( 10 Vitaliy 2021 11:49 )  Alb: 4.1 g/dL / Pro: 7.4 g/dL / ALK PHOS: 59 U/L / ALT: 50 U/L / AST: 35 U/L / GGT: x           PT/INR -  13.4 sec / 1.16 ratio   ( 10 Vitaliy 2021 11:49 )       PTT -  27.0 sec   ( 10 Vitaliy 2021 11:49 )  CAPILLARY BLOOD GLUCOSE      ct foot -   IMPRESSION: Multiple acute fractures at the midfoot including fractures of the medial cuneiform, lateral cuneiform, cuboid, and bases of the first, second, and fourth metatarsals, as above.    Nondisplaced coronally oriented fracture of the posterior malleolus. Cortical avulsion fracture of the anterior aspect of the distal fibula.

## 2021-01-10 NOTE — CONSULT NOTE ADULT - ATTENDING COMMENTS
cardiomyopathy : s?p ICD./ non coplaint with follow up.   if echo ok then proceed with surgery as indicated.

## 2021-01-10 NOTE — CONSULT NOTE ADULT - SUBJECTIVE AND OBJECTIVE BOX
[facility]                                                               Office:  [address] [address]                                                              Telephone: [phone]. Fax:[phone]                                                                        CARDIOLOGY CONSULTATION NOTE                                                                                             Consult requested by:    Reason for Consultation:   History obtained by: Patient and medical record   obtained: No    Chief complaint:    Patient is a 55y old  Male who presents with a chief complaint of foot fracture (10 Vitaliy 2021 12:05)      HPI:  56yo male w/PMH NICM ([facility] 2013- non-ischemic, TTE 2016-EF 20 to 25%), tachycardia induced cardiomyopathy, Aortic aneurysm/dilated sinus of valsalva (4.3 cm), bicuspid aortic valve, chronic systolic HFrEF (2016- EF 20-25%), GERD, HLD, CHELITA, pAFib (on Eliquis) s/p atrial fibrillation ablation w/pulmonary vein isolation 10/8/13 (unable to be on amiodarone d/t lung injury), pAFlutter, 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 Has not followed w/Dr. Khan in 4+yrs. PMD Dr. Abebe. Reports good functional capacity, goes to gym. Patient presented to the ED c/o left foot pain s/p mechanical fall down stairs last night while drinking during football game, denies LOC or head trauma.  Per note has multiple foot fractures requiring surgical intervention. Denies exertional chest pain/pressure, palpitations, irregular and/or rapid heart beat, SOB, VASQUEZ, syncope/near syncope, dizziness, orthopnea, PND, cough, edema, f/c, n/v/d, hematuria, or hematochezia.         REVIEW OF SYMPTOMS:     CONSTITUTIONAL: No fever, weight loss, or fatigue  ENMT:  No difficulty hearing, tinnitus, vertigo; No sinus or throat pain  NECK: No pain or stiffness  CARDIOVASCULAR: No chest pain, dyspnea, syncope, palpitations, dizziness, Orthopnea, Paroxsymal nocturnal dyspnea  RESPIRATORY: No Dyspnea on exertion, Shortness of breath, cough, wheezing  : No dysuria, no hematuria   GI: No dark color stool, no melena, no diarrhea, no constipation, no abdominal pain   NEURO: No headache, no dizziness, no slurred speech   MUSCULOSKELETAL: as per HPI  PSYCH: No agitation, no anxiety.    ALL OTHER REVIEW OF SYSTEMS ARE NEGATIVE.      PREVIOUS DIAGNOSTIC TESTING  ECHO FINDINGS:  < from: TTE Echo Complete w/Doppler (06.21.16 @ 19:20) >  IMPRESSION: Summary:   1. Left ventricular ejection fraction, by visual estimation, is 20 to   25%.   2. Severely decreased global left ventricular systolic function.   3. The left ventricular diastolic function could not be assessed in this   study.   4. Mildly increased left ventricular internal cavity size.   5. Normal RV size with mildly reduced RV function.   6. Mild to moderately enlarged left atrium.   7. The right atrium is normal in size.   8. Thickening of the anterior and posterior mitral valve leaflets.   9. Mild mitralvalve regurgitation.  10. Aortic valve is bicuspid.  11. Sclerotic aortic valve with normal opening.  12. Dilatation of the aortic root and ascending aorta.  13. There is no evidence of pericardial effusion.     MD Willie Electronically signed on 6/22/2016 at 9:19:25 AM    < end of copied text >          ALLERGIES: Allergies    No Known Allergies    Intolerances          PAST MEDICAL HISTORY  GERD (gastroesophageal reflux disease)    CHELITA (obstructive sleep apnea)    Afib        PAST SURGICAL HISTORY  S/P ablation of atrial fibrillation        FAMILY HISTORY:  FH: hypertension  father        SOCIAL HISTORY:    CIGARETTES: 1ppw x30+yrs  ALCOHOL: 12pack of beer per week  DRUGS: Denies      CURRENT MEDICATIONS:  aMIOdarone    Tablet 200 milliGRAM(s) Oral daily           HOME MEDICATIONS:          Vital Signs Last 24 Hrs  T(C): 36.8 (10 Vitaliy 2021 07:12), Max: 36.8 (10 Vitaliy 2021 07:12)  T(F): 98.2 (10 Vitaliy 2021 07:12), Max: 98.2 (10 Vitaliy 2021 07:12)  HR: 85 (10 Vitaliy 2021 07:12) (85 - 85)  BP: 136/78 (10 Vitaliy 2021 07:12) (136/78 - 136/78)  BP(mean): --  RR: 18 (10 Vitaliy 2021 07:12) (18 - 18)  SpO2: 98% (10 Vitaliy 2021 07:12) (98% - 98%)      PHYSICAL EXAM:  Appearance: NAD, well nourished	  Neurologic: A&Ox3, PERRL, EOMI, Grossly non-focal with full strength in all four extremities  HEENT:   Normal oral mucosa, sclera non-icteric	  Lymphatic: No cervical lymphadenopathy  Cardiovascular: Normal S1 S2, No JVD, No cardiac murmurs, No carotid bruits  Respiratory: Lungs clear to auscultation	  Psychiatry: Mood & affect appropriate  Gastrointestinal:  Soft, Non-tender, + BS, no bruits	  Extremities: Left foot swelling and ecchymosis. No edema  Vascular: Peripheral pulses palpable 2+ bilaterally  Skin: No rashes, No Erythema, No ecchymoses, No cyanosis  Lines and Tubes: n/a    Intake and output:     LABS:                        15.7   10.54 )-----------( 246      ( 10 Vitaliy 2021 11:49 )             43.6     01-10    137  |  101  |  12.0  ----------------------------<  114<H>  4.4   |  20.0<L>  |  0.90    Ca    9.0      10 Vitaliy 2021 11:49    TPro  7.4  /  Alb  4.1  /  TBili  0.8  /  DBili  x   /  AST  35  /  ALT  50<H>  /  AlkPhos  59  01-10      ;p-BNP=  PT/INR - ( 10 Vitaliy 2021 11:49 )   PT: 13.4 sec;   INR: 1.16 ratio         PTT - ( 10 Vitaliy 2021 11:49 )  PTT:27.0 sec      INTERPRETATION OF TELEMETRY: no CM  ECG: ordered    RADIOLOGY & ADDITIONAL STUDIES:    X-ray:   CT scan:   MRI:

## 2021-01-10 NOTE — H&P ADULT - NSHPPHYSICALEXAM_GEN_ALL_CORE
PHYSICAL EXAM:    GENERAL: NAD, obese  HEAD:  Atraumatic, Normocephalic  EYES: EOMI, PERRLA, conjunctiva and sclera clear  ENMT: No tonsillar erythema, exudates, or enlargement; Moist mucous membranes, Good dentition, No lesions  NECK: Supple, No JVD, Normal thyroid  NERVOUS SYSTEM:  Alert & Oriented X3, Good concentration; Motor Strength 5/5 B/L upper and lower extremities; DTRs 2+ intact and symmetric  CHEST/LUNG: Clear to percussion bilaterally; left ppm   HEART: Regular rate and rhythm; No murmurs, rubs, or gallops  ABDOMEN: Soft, Nontender, Nondistended; Bowel sounds present  EXTREMITIES:  left foot tenderness, bruising, swelling  LYMPH: No lymphadenopathy noted

## 2021-01-10 NOTE — H&P ADULT - NSHPREVIEWOFSYSTEMS_GEN_ALL_CORE
REVIEW OF SYSTEMS:    CONSTITUTIONAL: pain  EYES: No eye pain, visual disturbances, or discharge  ENMT:  No difficulty hearing, tinnitus, vertigo; No sinus or throat pain  NECK: No pain or stiffness  BREASTS: No pain, masses, or nipple discharge  RESPIRATORY: No cough, wheezing, chills or hemoptysis; No shortness of breath  CARDIOVASCULAR: No chest pain, palpitations, dizziness, or leg swelling  GASTROINTESTINAL: No abdominal or epigastric pain. No nausea, vomiting, or hematemesis; No diarrhea or constipation. No melena or hematochezia.  GENITOURINARY: No dysuria, frequency, hematuria, or incontinence  NEUROLOGICAL: No headaches, memory loss, loss of strength, numbness, or tremors  SKIN: bruising of foot   LYMPH NODES: No enlarged glands  ENDOCRINE: No heat or cold intolerance; No hair loss  MUSCULOSKELETAL: foot pain   PSYCHIATRIC: No depression, anxiety, mood swings, or difficulty sleeping  HEME/LYMPH: No easy bruising, or bleeding gums  ALLERY AND IMMUNOLOGIC: No hives or eczema

## 2021-01-10 NOTE — ED PROVIDER NOTE - CARE PLAN
Principal Discharge DX:	Closed nondisplaced fracture of cuboid of left foot, initial encounter  Secondary Diagnosis:	Fracture of tibia and fibula, left, closed, initial encounter  Secondary Diagnosis:	Closed nondisplaced fracture of first metatarsal bone of left foot, initial encounter

## 2021-01-10 NOTE — ED PROVIDER NOTE - CLINICAL SUMMARY MEDICAL DECISION MAKING FREE TEXT BOX
56 y/o M was drinking last night fell down 12 steps, denies LOC, on Eliquis.  Will obtain CT head to r/o bleed, xray foot and ankle to r/o fx, medicate for pain, reassess.

## 2021-01-10 NOTE — ED PROVIDER NOTE - ATTENDING CONTRIBUTION TO CARE
The patient seen and examined    Multiple Foot Fractures    I, Amador Mills, performed the initial face to face bedside interview with this patient regarding history of present illness, review of symptoms and relevant past medical, social and family history.  I completed an independent physical examination.  I was the initial provider who evaluated this patient. I have signed out the follow up of any pending tests (i.e. labs, radiological studies) to the ACP.  I have communicated the patient’s plan of care and disposition with the ACP. The patient seen and examined    Multiple Foot Fractures    I, Amador Mills, performed the initial face to face bedside interview with this patient regarding history of present illness, review of symptoms and relevant past medical, social and family history.  I completed an independent physical examination.  I was the initial provider who evaluated this patient. I have signed out the follow up of any pending tests (i.e. labs, radiological studies) to the ACP.  I have communicated the patient’s plan of care and disposition with the ACP..

## 2021-01-10 NOTE — ED ADULT NURSE NOTE - OBJECTIVE STATEMENT
54y/o male c.o left foot, bruise onto left foot. pt aox4, resp even unlabored, minimal ROM due to pain

## 2021-01-10 NOTE — H&P ADULT - HISTORY OF PRESENT ILLNESS
Patient is a 56 y/o M with pmh of afib on eliquis s/p ppm, obesity presents s/p fall. Patient states he was drinking alcohol during the football game and tripped and fell down the stairs. Patient denies any prior chest pain, palpitations or LOC. Patient presented to the ed and found to have multuiple left foot fractures and podiatry was consulted. Patient seen at bedside and complains of pain but denies any other complaints Patient is a 54 y/o M with pmh of afib on eliquis s/p ppm, NICM, bicuspid aortic valve, obesity presents s/p fall. Patient states he was drinking alcohol during the football game and tripped and fell down the stairs. Patient denies any prior chest pain, palpitations or LOC. Patient presented to the ed and found to have multuiple left foot fractures and podiatry was consulted. Patient seen at bedside and complains of pain but denies any other complaints

## 2021-01-11 LAB
ALBUMIN SERPL ELPH-MCNC: 4 G/DL — SIGNIFICANT CHANGE UP (ref 3.3–5.2)
ALP SERPL-CCNC: 57 U/L — SIGNIFICANT CHANGE UP (ref 40–120)
ALT FLD-CCNC: 42 U/L — HIGH
ANION GAP SERPL CALC-SCNC: 10 MMOL/L — SIGNIFICANT CHANGE UP (ref 5–17)
AST SERPL-CCNC: 26 U/L — SIGNIFICANT CHANGE UP
BASOPHILS # BLD AUTO: 0.06 K/UL — SIGNIFICANT CHANGE UP (ref 0–0.2)
BASOPHILS NFR BLD AUTO: 0.8 % — SIGNIFICANT CHANGE UP (ref 0–2)
BILIRUB SERPL-MCNC: 1.2 MG/DL — SIGNIFICANT CHANGE UP (ref 0.4–2)
BUN SERPL-MCNC: 13 MG/DL — SIGNIFICANT CHANGE UP (ref 8–20)
CALCIUM SERPL-MCNC: 9 MG/DL — SIGNIFICANT CHANGE UP (ref 8.6–10.2)
CHLORIDE SERPL-SCNC: 99 MMOL/L — SIGNIFICANT CHANGE UP (ref 98–107)
CO2 SERPL-SCNC: 26 MMOL/L — SIGNIFICANT CHANGE UP (ref 22–29)
CREAT SERPL-MCNC: 0.83 MG/DL — SIGNIFICANT CHANGE UP (ref 0.5–1.3)
EOSINOPHIL # BLD AUTO: 0.29 K/UL — SIGNIFICANT CHANGE UP (ref 0–0.5)
EOSINOPHIL NFR BLD AUTO: 4 % — SIGNIFICANT CHANGE UP (ref 0–6)
GLUCOSE SERPL-MCNC: 114 MG/DL — HIGH (ref 70–99)
HCT VFR BLD CALC: 41.6 % — SIGNIFICANT CHANGE UP (ref 39–50)
HGB BLD-MCNC: 14.6 G/DL — SIGNIFICANT CHANGE UP (ref 13–17)
IMM GRANULOCYTES NFR BLD AUTO: 0.7 % — SIGNIFICANT CHANGE UP (ref 0–1.5)
LYMPHOCYTES # BLD AUTO: 1.87 K/UL — SIGNIFICANT CHANGE UP (ref 1–3.3)
LYMPHOCYTES # BLD AUTO: 26 % — SIGNIFICANT CHANGE UP (ref 13–44)
MAGNESIUM SERPL-MCNC: 2 MG/DL — SIGNIFICANT CHANGE UP (ref 1.8–2.6)
MCHC RBC-ENTMCNC: 34.6 PG — HIGH (ref 27–34)
MCHC RBC-ENTMCNC: 35.1 GM/DL — SIGNIFICANT CHANGE UP (ref 32–36)
MCV RBC AUTO: 98.6 FL — SIGNIFICANT CHANGE UP (ref 80–100)
MONOCYTES # BLD AUTO: 0.78 K/UL — SIGNIFICANT CHANGE UP (ref 0–0.9)
MONOCYTES NFR BLD AUTO: 10.9 % — SIGNIFICANT CHANGE UP (ref 2–14)
NEUTROPHILS # BLD AUTO: 4.13 K/UL — SIGNIFICANT CHANGE UP (ref 1.8–7.4)
NEUTROPHILS NFR BLD AUTO: 57.6 % — SIGNIFICANT CHANGE UP (ref 43–77)
PLATELET # BLD AUTO: 200 K/UL — SIGNIFICANT CHANGE UP (ref 150–400)
POTASSIUM SERPL-MCNC: 4 MMOL/L — SIGNIFICANT CHANGE UP (ref 3.5–5.3)
POTASSIUM SERPL-SCNC: 4 MMOL/L — SIGNIFICANT CHANGE UP (ref 3.5–5.3)
PROT SERPL-MCNC: 7 G/DL — SIGNIFICANT CHANGE UP (ref 6.6–8.7)
RBC # BLD: 4.22 M/UL — SIGNIFICANT CHANGE UP (ref 4.2–5.8)
RBC # FLD: 12.3 % — SIGNIFICANT CHANGE UP (ref 10.3–14.5)
SARS-COV-2 IGG SERPL QL IA: NEGATIVE — SIGNIFICANT CHANGE UP
SARS-COV-2 IGM SERPL IA-ACNC: 0.07 INDEX — SIGNIFICANT CHANGE UP
SODIUM SERPL-SCNC: 135 MMOL/L — SIGNIFICANT CHANGE UP (ref 135–145)
WBC # BLD: 7.18 K/UL — SIGNIFICANT CHANGE UP (ref 3.8–10.5)
WBC # FLD AUTO: 7.18 K/UL — SIGNIFICANT CHANGE UP (ref 3.8–10.5)

## 2021-01-11 PROCEDURE — 99233 SBSQ HOSP IP/OBS HIGH 50: CPT

## 2021-01-11 PROCEDURE — 73630 X-RAY EXAM OF FOOT: CPT | Mod: 26,LT

## 2021-01-11 PROCEDURE — 93306 TTE W/DOPPLER COMPLETE: CPT | Mod: 26

## 2021-01-11 RX ORDER — OXYCODONE AND ACETAMINOPHEN 5; 325 MG/1; MG/1
1 TABLET ORAL EVERY 4 HOURS
Refills: 0 | Status: DISCONTINUED | OUTPATIENT
Start: 2021-01-11 | End: 2021-01-13

## 2021-01-11 RX ORDER — ACETAMINOPHEN 500 MG
650 TABLET ORAL EVERY 6 HOURS
Refills: 0 | Status: DISCONTINUED | OUTPATIENT
Start: 2021-01-11 | End: 2021-01-13

## 2021-01-11 RX ORDER — SODIUM CHLORIDE 9 MG/ML
1000 INJECTION, SOLUTION INTRAVENOUS
Refills: 0 | Status: DISCONTINUED | OUTPATIENT
Start: 2021-01-11 | End: 2021-01-11

## 2021-01-11 RX ORDER — MORPHINE SULFATE 50 MG/1
4 CAPSULE, EXTENDED RELEASE ORAL
Refills: 0 | Status: DISCONTINUED | OUTPATIENT
Start: 2021-01-11 | End: 2021-01-13

## 2021-01-11 RX ORDER — METOPROLOL TARTRATE 50 MG
25 TABLET ORAL DAILY
Refills: 0 | Status: DISCONTINUED | OUTPATIENT
Start: 2021-01-11 | End: 2021-01-11

## 2021-01-11 RX ORDER — HYDROMORPHONE HYDROCHLORIDE 2 MG/ML
0.5 INJECTION INTRAMUSCULAR; INTRAVENOUS; SUBCUTANEOUS
Refills: 0 | Status: DISCONTINUED | OUTPATIENT
Start: 2021-01-11 | End: 2021-01-11

## 2021-01-11 RX ORDER — METOPROLOL TARTRATE 50 MG
25 TABLET ORAL DAILY
Refills: 0 | Status: DISCONTINUED | OUTPATIENT
Start: 2021-01-11 | End: 2021-01-13

## 2021-01-11 RX ADMIN — MORPHINE SULFATE 6 MILLIGRAM(S): 50 CAPSULE, EXTENDED RELEASE ORAL at 06:08

## 2021-01-11 RX ADMIN — MORPHINE SULFATE 6 MILLIGRAM(S): 50 CAPSULE, EXTENDED RELEASE ORAL at 10:29

## 2021-01-11 RX ADMIN — MORPHINE SULFATE 4 MILLIGRAM(S): 50 CAPSULE, EXTENDED RELEASE ORAL at 07:37

## 2021-01-11 RX ADMIN — MORPHINE SULFATE 6 MILLIGRAM(S): 50 CAPSULE, EXTENDED RELEASE ORAL at 02:54

## 2021-01-11 RX ADMIN — MORPHINE SULFATE 6 MILLIGRAM(S): 50 CAPSULE, EXTENDED RELEASE ORAL at 15:08

## 2021-01-11 RX ADMIN — Medication 25 MILLIGRAM(S): at 14:28

## 2021-01-11 RX ADMIN — MORPHINE SULFATE 4 MILLIGRAM(S): 50 CAPSULE, EXTENDED RELEASE ORAL at 11:52

## 2021-01-11 NOTE — PROGRESS NOTE ADULT - SUBJECTIVE AND OBJECTIVE BOX
Patient is a 55y old  Male who presents with a chief complaint of foot fracture (10 Vitaliy 2021 12:46)      HPI:  Patient is a 54 y/o M with pmh of afib on eliquis s/p ppm, NICM, bicuspid aortic valve, obesity presents s/p fall. Patient states he was drinking alcohol during the football game and tripped and fell down the stairs. Patient denies any prior chest pain, palpitations or LOC. Patient presented to the ed and found to have multiple left foot fractures and podiatry was consulted. Patient seen at bedside and complains of pain but denies any other complaints (10 Vitaliy 2021 12:05)      Podiatry HPI: History as above. Seen bedside in CDU with compressive dressing intact. Patient is for surgery this evening for ORIF left foot fractures. Pt adds he has not eaten since yesterday. Admits to pain and swelling in L foot     PMH:   GERD (gastroesophageal reflux disease)  CHELITA (obstructive sleep apnea)  Afib      Allergies: No Known Allergies    Medications:   acetaminophen   Tablet .. 650 milliGRAM(s) Oral every 6 hours PRN  morphine  - Injectable 4 milliGRAM(s) IV Push every 3 hours PRN  morphine  - Injectable 6 milliGRAM(s) IV Push every 3 hours PRN    FH: hypertension      PSX: S/P ablation of atrial fibrillation      SH:   Social History:  denies drugs  social drinker (10 Vitaliy 2021 12:05)      Vital Signs Last 24 Hrs  T(C): 36.9 (11 Jan 2021 07:57), Max: 37.2 (11 Jan 2021 02:46)  T(F): 98.4 (11 Jan 2021 07:57), Max: 99 (11 Jan 2021 02:46)  HR: 72 (11 Jan 2021 07:57) (72 - 117)  BP: 117/66 (11 Jan 2021 07:57) (114/78 - 144/103)  BP(mean): --  RR: 19 (11 Jan 2021 07:57) (17 - 22)  SpO2: 92% (11 Jan 2021 07:57) (91% - 95%)    LABS                        14.6   7.18  )-----------( 200      ( 11 Jan 2021 10:45 )             41.6     WBC Count: 7.18 K/uL (01-11 @ 10:45)  WBC Count: 10.54 K/uL (01-10 @ 11:49)    01-11    135  |  99  |  13.0  ----------------------------<  114<H>  4.0   |  26.0  |  0.83    Ca    9.0      11 Jan 2021 10:45  Mg     2.0     01-11    TPro  7.0  /  Alb  4.0  /  TBili  1.2  /  DBili  x   /  AST  26  /  ALT  42<H>  /  AlkPhos  57  01-11        PHYSICAL EXAM  GEN: WASHINGTON RICHMOND is a pleasant well-nourished, well developed 55y Male in no acute distress, alert awake, and oriented to person, place and time.   LE Focused:    Vasc:  DP and PT pulses +2/4 b/l.  CFT brisk to digits. feet warm to touch.    Derm: No open lesions.   Ecchymosis to plantar forefoot.    Neuro: Protective sensation intact.  MSK: Diffuse pain on palpation of forefoot, midfoot, and rearfoot secondary to multiple fractures.  Patient can actively move digits.  No pain with ROM of digits 2-4.  Achilles tendon intact.      Imaging:       `< from: CT Foot No Cont, Left (01.10.21 @ 09:35) >   EXAM:  CT FOOT ONLY LT                        PROCEDURE DATE:  01/10/2021    INTERPRETATION:  EXAMINATION: CT left foot without contrast    CLINICAL INFORMATION: Severe left foot pain    TECHNIQUE: Axial CT images were obtained through theleft foot. Coronal and sagittal reformatted images were made. 3-D reconstruction images were also performed.    FINDINGS: There are multiple acute fractures at the midfoot. This includes: Comminuted minimally displaced fracture at the lateral aspect of the medial cuneiform at the proximal attachment of the Lisfranc ligament with extension to the distal articular surface of the medial cuneiform. Comminuted impacted fracture of the lateral aspect of the base of the first metatarsal. Obliquely oriented minimally displaced and comminuted fracture of the base of the second metatarsal extending to its proximal shaft. Minimally displaced avulsion fracture at the distal dorsal aspect of the lateral cuneiform. Mildly comminuted obliquely oriented fracture of the dorsal lateral aspect of the cuboid with up to 0.5 cm of dorsal lateral displacement of the dominant dorsal fracture fragment. Displaced at the medial aspect of the base of the fourth metatarsal.    There is also a mildly displaced avulsion fracture of the anterior aspect of the distal fibula at the attachment of the anterior inferior tibiofibular ligament and there is a nondisplaced coronally oriented fracture of the posterior malleolus.    There is dorsal subcutaneous soft tissue edema and lateral subcutaneous edema at the ankle. There are no advanced arthritic changes.    The 3-D reconstructed images confirm the above fractures.    IMPRESSION: Multiple acute fractures at the midfoot including fractures of the medial cuneiform, lateral cuneiform, cuboid, and bases of the first, second, and fourth metatarsals, as above.    Nondisplaced coronally oriented fracture of the posterior malleolus. Cortical avulsion fracture of the anterior aspect of the distal fibula.  WASHINGTON MATA MD; Attending Radiologist  This document has been electronically signed. Vitaliy 10 2021 10:10AM    < end of copied text >          INTERPRETATION:  Clinical Information: Left foot pain, unable to bear weight    Technique: 3 views left foot. 3 views left ankle.    Comparison: None    Findings/  Impression: There is no acute fracture or dislocation. Mild cortical irregularity involving the medial aspect of the first MTP joint, likely mild arthropathy. Severe dorsal foot soft tissue swelling. Consider cross-sectional imaging if symptoms persist.            RASHI HORTON MD; Attending Interventional Radiologist  This document has been electronically signed. Vitaliy 10 2021  8:35AM    < end of copied text >        A:   Left Lisfranc fracture dislocation  left cuboid fracture    P:  Patient evaluated, chart reviewed  X-rays reviewed  CT reviewed- Lisfranc fracture, cuboid fracture,   Re-applied multilayer compressive dressing  Plan for ORIF of left lisfranc, left cuboid on Today at 6PM pending clearance   NPO   Please assess for medical clearance  Recommend strict left leg elevation until surgery  NWB to left foot  Discussed signs of compartment syndrome with the patient, and that patient does not have any signs of compartment syndrome as he can move his digits, has pulses, has sensation, warmth equal to contralateral limb..   Seen with Attending Dr. Norwood

## 2021-01-11 NOTE — PROGRESS NOTE ADULT - SUBJECTIVE AND OBJECTIVE BOX
[facility]                                                       [facility]                                                               Office: [address] [address]                                                              Telephone: [phone]. Fax:[phone]                                                                             PROGRESS NOTE  Reason for follow up: cardiac clearance, NICM, CHFrecEF  Update: 1/11: Patient evaluated s/p TTE. Denies anginal complaints or palpitations. CM reveals pAF w/rates up to 120, 70s when in SR.      Review of symptoms:   All review of systems negative unless indicated otherwise above.     	  Vitals:  T(C): 36.9 (01-11-21 @ 07:57), Max: 37.2 (01-11-21 @ 02:46)  HR: 72 (01-11-21 @ 07:57) (72 - 117)  BP: 117/66 (01-11-21 @ 07:57) (114/78 - 144/103)  RR: 19 (01-11-21 @ 07:57) (17 - 22)  SpO2: 92% (01-11-21 @ 07:57) (91% - 95%)  Wt(kg): --  I&O's Summary          PHYSICAL EXAM:  Appearance: NAD, well nourished	  Neurologic: A&Ox3, PERRL, EOMI, Grossly non-focal with full strength in all four extremities  HEENT:   Normal oral mucosa, sclera non-icteric	  Lymphatic: No cervical lymphadenopathy  Cardiovascular: Normal S1 S2, No JVD, No cardiac murmurs, No carotid bruits  Respiratory: Lungs clear to auscultation	  Psychiatry: Mood & affect appropriate  Gastrointestinal:  Soft, Non-tender, + BS, no bruits	  Extremities: Left foot wrapped. Normal range of motion, No clubbing, cyanosis or edema  Vascular: Peripheral pulses palpable 2+ bilaterally  Skin: No rashes, No Erythema, Left foot ecchymoses, No cyanosis  Lines and Tubes: n/a      CURRENT MEDICATIONS:        DIAGNOSTIC TESTING:  [ ] Echocardiogram:   < from: TTE Echo Complete w/o Contrast w/ Doppler (01.11.21 @ 09:06) >  Summary:   1. Technically limited study.   2. Endocardial visualization was enhanced with intravenous echo contrast.   3. Left ventricular ejection fraction, by visual estimation, is 45 to 50% (Bi-plane EF 46%).   4. Mildly decreased global left ventricular systolic function.   5. Mildly enlarged right ventricle.   6. Mildly reduced RV systolic function.   7. Mild tricuspid regurgitation.   8. Trace mitral valve regurgitation.   9. Mild thickening of the anterior and posterior mitral valve leaflets.  10. Aortic valve is bicuspid.  11. Based on limited views there is mild dilatation of the aortic root (3.8 cm). The remainder of the aorta is not well visualized. Consider dedicated imaging ifclinically indicated.  12. There is no evidence of pericardial effusion.  13. Compared to a prior study from 6/22/16, there is improvement in left ventricular systolic function.    MD Craig Electronically signed on 1/11/2021 at 11:03:48 AM    < end of copied text >      LABS:	 	                            14.6   7.18  )-----------( 200      ( 11 Jan 2021 10:45 )             41.6     01-11    135  |  99  |  13.0  ----------------------------<  114<H>  4.0   |  26.0  |  0.83    Ca    9.0      11 Jan 2021 10:45  Mg     2.0     01-11    TPro  7.0  /  Alb  4.0  /  TBili  1.2  /  DBili  x   /  AST  26  /  ALT  42<H>  /  AlkPhos  57  01-11    proBNP:   Lipid Profile:   HgA1c:   TSH:       TELEMETRY: pAF w/rates up to 120, 70s when in SR.  ECG:  Rapid AFib w/RVR @115bpm, low voltage QRS, NSST-T wave abnormalities

## 2021-01-11 NOTE — BRIEF OPERATIVE NOTE - NSICDXBRIEFPOSTOP_GEN_ALL_CORE_FT
POST-OP DIAGNOSIS:  Lisfranc dislocation, left, initial encounter 11-Jan-2021 20:17:53  Araceli Dean

## 2021-01-11 NOTE — PROGRESS NOTE ADULT - ASSESSMENT
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 Has not followed w/Dr. Khan in 4+yrs. PMD Dr. Abebe. Reports good functional capacity, goes to gym. Patient presented to the ED c/o left foot pain s/p mechanical fall down stairs last night while drinking during football game, denies LOC or head trauma.  Per note has multiple foot fractures requiring surgical intervention. Denies exertional chest pain/pressure, palpitations, irregular and/or rapid heart beat, SOB, VASQUEZ, syncope/near syncope, dizziness, orthopnea, PND, cough, edema, f/c, n/v/d, hematuria, or hematochezia.     1/11: Patient evaluated s/p TTE. Denies anginal complaints or palpitations. CM reveals pAF w/rates up to 120, 70s when in SR.    NICM, chronic systolic HFrecEF  -TTE 1/10: EF 45-50%. bicuspid aortic valve (known). mild dilation of aortic root 3.8cm (not well visualized). no RWMA or significant valvulopathy  -euvolemic on exam    Hx pAFib/AFlutter, Tachy Roslyn syndrome  -EKG pending  -s/p MDT Dual chamber avisa placed 6/27/16  -on Eliquis, may hold for surgical procedure and resume as soon as deemed safe by surgical team    Dilated aortic root  -outpatient vascular f/u    Pre-operative cardiovascular risk evaluation and management.   -No cardiac symptoms.   -EKG and TTE as noted above  -Non-ischemic ECG. METs > 4/undetermined. Benefits of surgery outweigh the risk. No further cardiac work up is needed.  Further cardiac work up will not change risk of the patient.  No active chest pain, acute coronary syndrome, decompensated CHF, significant valvular abnormality, or unstable arrhythmia.  Patient is currently optimized from a cardiac standpoint therefore no absolute cardiac contraindication to proceeding with procedure.     Smoking cessation  -Patient was counselled on tobacco cessation. They were informed of the increased risk of lung cancer and cardiovascular disease, COPD among other health risks associated with smoking and tobacco use. Cessation was advised. Patient will consider smoking cessation.       Assessment and recommendations are final when note is signed by the attending.

## 2021-01-11 NOTE — PROGRESS NOTE ADULT - SUBJECTIVE AND OBJECTIVE BOX
Chief Complaint:  foot fracture    SUBJECTIVE / OVERNIGHT EVENTS: No acute events overnight.  Pt reports improved foot pain and swelling.     Patient denies chest pain, SOB, abd pain, N/V, fever, chills, dysuria or any other complaints. All remainder ROS negative.       I&O's Summary        PHYSICAL EXAM:  Vital Signs Last 24 Hrs  T(C): 36.9 (11 Jan 2021 07:57), Max: 37.2 (11 Jan 2021 02:46)  T(F): 98.4 (11 Jan 2021 07:57), Max: 99 (11 Jan 2021 02:46)  HR: 72 (11 Jan 2021 07:57) (72 - 117)  BP: 117/66 (11 Jan 2021 07:57) (114/78 - 144/103)  BP(mean): --  RR: 19 (11 Jan 2021 07:57) (17 - 22)  SpO2: 92% (11 Jan 2021 07:57) (91% - 95%)      CONSTITUTIONAL: pt examined bedside, laying comfortably in bed in NAD  HEENT: NC/AT, moist oral mucosa, clear conjunctiva, sclera nonicteric, EOMI  RESPIRATORY: Normal respiratory effort; CTA b/l, no wheezing, rhonchi, rales  CARDIOVASCULAR: RRR, normal S1 and S2, no murmur/rub/gallop  ABDOMEN: soft, obese abdomen, NT/ND, normoactive bowel sounds, no rebound/guarding, no HSM  MUSCLOSKELETAL:  Left foot wrapped in ace bandage  EXTREMITIES: No cynaosis, no clubbing, +Left foot edema, peripheral pulses are 2+ bilaterally  NEUROLOGY: A+O to person, place, and time, no focal neurologic deficits appreciated   SKIN: No rashes or no palpable lesions        LABS:                        14.6   7.18  )-----------( 200      ( 11 Jan 2021 10:45 )             41.6     01-10    137  |  101  |  12.0  ----------------------------<  114<H>  4.4   |  20.0<L>  |  0.90    Ca    9.0      10 Vitaliy 2021 11:49    TPro  7.4  /  Alb  4.1  /  TBili  0.8  /  DBili  x   /  AST  35  /  ALT  50<H>  /  AlkPhos  59  01-10    PT/INR - ( 10 Vitaliy 2021 11:49 )   PT: 13.4 sec;   INR: 1.16 ratio         PTT - ( 10 Vitaliy 2021 11:49 )  PTT:27.0 sec          CAPILLARY BLOOD GLUCOSE            RADIOLOGY & ADDITIONAL TESTS:          MEDICATIONS  (STANDING):    MEDICATIONS  (PRN):  acetaminophen   Tablet .. 650 milliGRAM(s) Oral every 6 hours PRN Temp greater or equal to 38C (100.4F), Mild Pain (1 - 3)  morphine  - Injectable 4 milliGRAM(s) IV Push every 3 hours PRN Moderate Pain (4 - 6)  morphine  - Injectable 6 milliGRAM(s) IV Push every 3 hours PRN Severe Pain (7 - 10)

## 2021-01-11 NOTE — CHART NOTE - NSCHARTNOTEFT_GEN_A_CORE
Event note     called by RN for increasing left foot pain, numbness, and chills, reported by patient.    pt seen/examined at bedside NAD. Pt reports he woke up feeling very hot with increased pain and numbness to left foot, however said he could feel when the nurse touched his foot. He reports the pain as throbbing and pressure like 7/10 which is intermittent and non radiating. Unwrapped left foot from ace wrap and soft cost material, mild to moderate non pitting edema, with ecchymosis to distal metatarsal and plantar aspect of foot. +sensation and motor intact. doppler used to obtain PT/DP strong ausculation. Elevated on 2 pillows after rewrapping. Pt afebrile wit No evidence of compartment syndrome, will continue to monitor.      T(F): 99 (01-11-21 @ 02:46)  HR: 111 (01-11-21 @ 02:30)  BP: 114/78 (01-11-21 @ 02:30)  RR: 22 (01-11-21 @ 02:30)  SpO2: 94% (01-11-21 @ 02:30)    PHYSICAL EXAM:   Neurological: AAOx3, CNII-XII intact,  strength 5/5 b/l  Cardiovascular: RRR  Respiratory: CTA  Gastrointestinal: soft, NT, ND, BS+  Extremities: warm, no dependent edema  Vascular: Left foot ecchymosis to distal metatarsal and plantar aspect of foot. +sensation and motor intact. doppler used to obtain PT/DP strong ausculation  LABS:    01-10    137  |  101  |  12.0  ----------------------------<  114<H>  4.4   |  20.0<L>  |  0.90    Ca    9.0      10 Vitaliy 2021 11:49    TPro  7.4  /  Alb  4.1  /  TBili  0.8  /  DBili  x   /  AST  35  /  ALT  50<H>  /  AlkPhos  59  01-10  LIVER FUNCTIONS - ( 10 Vitaliy 2021 11:49 )  Alb: 4.1 g/dL / Pro: 7.4 g/dL / ALK PHOS: 59 U/L / ALT: 50 U/L / AST: 35 U/L / GGT: x                               15.7   10.54 )-----------( 246      ( 10 Vitaliy 2021 11:49 )             43.6   PT/INR - ( 10 Vitaliy 2021 11:49 )   PT: 13.4 sec;   INR: 1.16 ratio         PTT - ( 10 Vitaliy 2021 11:49 )  PTT:27.0 sec  CAPILLARY BLOOD GLUCOSE          A/P:  1. Left foot pain in the setting of Nondisplaced coronally oriented fracture of the posterior malleolus. Cortical avulsion fracture of the anterior aspect of the distal fibula and multiple metarsal fractures.  *continue foot elevation  *continue Morphine Prn pain  *NPO for ORIF today

## 2021-01-11 NOTE — PROGRESS NOTE ADULT - ASSESSMENT
56 y/o M w/ a pmh of pAfib (on eliquis), s/p ppm, HFrEF (EF 20-25% in 2016), tachy/roslyn syndrome hx, HLD and CHELITA presents from home s/p fall and found to have a left foot fracture.  Pt is without evidence of compartment syndrome and is scheduled ORIF today.         Left foot fracture s/p fall  - CT left foot; mx acute fractures at the midfoot including fractures of the medial cuneiform, lateral cuneiform, cuboid, and bases of the first, second, and fourth metatarsals.  A nondisplaced coronally oriented fracture of the posterior malleolus. Cortical avulsion fracture of the anterior aspect of the distal fibula.  - Pt scheduled for ORIF today therefore will continue to hold Eliquis   - Cardio consulted for surgical clearance  - c/w Morphine for severe pain control       Afib / tachy-roslny syndrome   - Currently rate controlled  - Eliquis on hold given pt scheduled for OR today  - CHADSVASC = 2; no AC bridging indicated   - Will resume Eliquis after procedure once cleared by surgical team       Chronic HFrEF   - No evidence of acute decomponsation at this time   - Last known EF 20-25% in 2016  - ECHO completed and report pending  - Cardio following for surgical clearance      HLD  - c/w home medications      CHELITA  - Uses nocturnal CPAP at home  - Settings unknown         Diet: NPO for OR today      Code Status: Full Code    DVT ppx: hold eliquis given pt scheduled for ORIF today    Dispo: Pt pending OR today.  Anticipate d/c in 2-3 days once cleared by podiatry.

## 2021-01-12 PROCEDURE — 99233 SBSQ HOSP IP/OBS HIGH 50: CPT

## 2021-01-12 RX ADMIN — Medication 25 MILLIGRAM(S): at 05:21

## 2021-01-12 RX ADMIN — OXYCODONE AND ACETAMINOPHEN 1 TABLET(S): 5; 325 TABLET ORAL at 17:58

## 2021-01-12 RX ADMIN — OXYCODONE AND ACETAMINOPHEN 1 TABLET(S): 5; 325 TABLET ORAL at 05:29

## 2021-01-12 RX ADMIN — Medication 650 MILLIGRAM(S): at 17:58

## 2021-01-12 RX ADMIN — OXYCODONE AND ACETAMINOPHEN 1 TABLET(S): 5; 325 TABLET ORAL at 12:00

## 2021-01-12 RX ADMIN — MORPHINE SULFATE 4 MILLIGRAM(S): 50 CAPSULE, EXTENDED RELEASE ORAL at 00:08

## 2021-01-12 NOTE — PROGRESS NOTE PEDS - ATTENDING COMMENTS
GENERAL:pt examined bedside, laying comfortably in bed in NAD  HEENT: NC/AT, moist oral mucosa, clear conjunctiva, sclera nonicteric, EOMI  RESPIRATORY: Normal respiratory effort; CTA b/l, no wheezing, rhonchi, rales  CARDIOVASCULAR: RRR, normal S1 and S2, no murmur/rub/gallop  ABDOMEN: soft, obese abdomen, NT/ND, normoactive bowel sounds, no rebound/guarding, no HSM  MUSCLOSKELETAL:  Left foot wrapped in ace bandage, toes warm to touch  EXTREMITIES: No cynaosis, no clubbing, +Left foot edema, peripheral pulses are 2+ bilaterally  NEUROLOGY: A+O to person, place, and time, no focal neurologic deficits appreciated   SKIN: No rashes or no palpable lesions

## 2021-01-12 NOTE — PROGRESS NOTE PEDS - ASSESSMENT
56 y/o M w/ a pmh of pAfib (on eliquis), s/p ppm, HFrEF (EF 20-25% in 2016), tachy/roslyn syndrome hx, HLD and CHELITA presents from home s/p fall and found to have a left foot fracture. Patient is S/P ORIF left Lisfranc fracture done on 1/11/2,    Left foot fracture s/p ORIF left Lisfranc fracture  - CT left foot; mx acute fractures at the midfoot including fractures of the medial cuneiform, lateral cuneiform, cuboid, and bases of the first, second, and fourth metatarsals.  A nondisplaced coronally oriented fracture of the posterior malleolus. Cortical avulsion fracture of the anterior aspect of the distal fibula.  -Hall and posterior splint left intact   -Splint to remain clean, dry and intact  -Continue left leg elevation  -NWB to left foot in crutches - PT eval  - c/w Morphine for severe pain control       Afib / tachy-roslyn syndrome   - Currently rate controlled  - Eliquis on hold   - CHADSVASC = 2; no AC bridging indicated   - Will resume Eliquis after procedure once cleared by surgical team       Chronic HFrEF   - No evidence of acute decomponsation at this time   - TTE 1/10: EF 45-50%. bicuspid aortic valve (known). mild dilation of aortic root 3.8cm (not well visualized). no RWMA or significant valvulopathy  - ECHO completed and report pending  - Cardio following  HLD  - c/w home medications      CHELITA  - Uses nocturnal CPAP at home  - Settings unknown     Discharge: patient to be discharged to rehab when medically optimized

## 2021-01-12 NOTE — SBIRT NOTE ADULT - NSSBIRTALCPOSREINDET_GEN_A_CORE
Pt annoyed at consult, states the consumption documented in the patient profile (7-9 drinks 2-3x/wk, 6+ drinks monthly) is inaccurate. Pt states he has a hx of excessive drinking years ago, reports that now he has 1-2 drinks on a Sunday. 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 RN updated.

## 2021-01-12 NOTE — PROGRESS NOTE PEDS - SUBJECTIVE AND OBJECTIVE BOX
Patient is a 55 y/ male  with PMH  of AFIB on eliquis s/p PPM, NICM, bicuspid aortic valve, obesity presents s/p fall. . Patient denies  chest pain, palpitations or LOC, admits to drinking alcohol, resulting in fall . Patient presented to [facility] and found to have multiple left foot fractures and podiatry was consulted. Patient is S/P ORIF left Lisfranc fracture done on 1/11/2,  compression and posterior splint in place    Patient seen at bedside and complains of post operative  pain but denies chest pain, SOB, abd pain, N/V, fever, chills, dysuria or any other complaints. All remainder ROS negative.     Vital Signs Last 24 Hrs  T(C): 36.7 (12 Jan 2021 15:50), Max: 37.2 (11 Jan 2021 23:01)  T(F): 98 (12 Jan 2021 15:50), Max: 98.9 (11 Jan 2021 23:01)  HR: 73 (12 Jan 2021 15:50) (65 - 105)  BP: 114/72 (12 Jan 2021 15:50) (108/65 - 169/106)  BP(mean): 119 (11 Jan 2021 21:45) (116 - 134)  RR: 18 (12 Jan 2021 15:50) (12 - 20)  SpO2: 94% (12 Jan 2021 15:50) (92% - 99%)      CONSTITUTIONAL: pt examined bedside, laying comfortably in bed in NAD  HEENT: NC/AT, moist oral mucosa, clear conjunctiva, sclera nonicteric, EOMI  RESPIRATORY: Normal respiratory effort; CTA b/l, no wheezing, rhonchi, rales  CARDIOVASCULAR: RRR, normal S1 and S2, no murmur/rub/gallop  ABDOMEN: soft, obese abdomen, NT/ND, normoactive bowel sounds, no rebound/guarding, no HSM  MUSCLOSKELETAL:  Left foot wrapped in compression dressing/spint   EXTREMITIES: No cyanosis, no clubbing, +Left foot edema, peripheral pulses are 2+ bilaterally, toes mobile  NEUROLOGY: A+O to person, place, and time, no focal neurologic deficits appreciated   SKIN: No rashes or no palpable lesions          LABS:                          14.6   7.18  )-----------( 200      ( 11 Jan 2021 10:45 )             41.6     01-11    135  |  99  |  13.0  ----------------------------<  114<H>  4.0   |  26.0  |  0.83    Ca    9.0      11 Jan 2021 10:45  Mg     2.0     01-11    TPro  7.0  /  Alb  4.0  /  TBili  1.2  /  DBili  x   /  AST  26  /  ALT  42<H>  /  AlkPhos  57  01-11      MEDICATIONS  (STANDING):  metoprolol succinate ER 25 milliGRAM(s) Oral daily    MEDICATIONS  (PRN):  acetaminophen   Tablet .. 650 milliGRAM(s) Oral every 6 hours PRN Temp greater or equal to 38C (100.4F), Mild Pain (1 - 3)  morphine  - Injectable 4 milliGRAM(s) IV Push every 3 hours PRN Moderate Pain (4 - 6)  oxycodone    5 mG/acetaminophen 325 mG 1 Tablet(s) Oral every 4 hours PRN Moderate Pain (4 - 6)

## 2021-01-12 NOTE — PHYSICAL THERAPY INITIAL EVALUATION ADULT - LEVEL OF INDEPENDENCE: STAIR NEGOTIATION, REHAB EVAL
supervision required to get to level of step and up but independently performs modified bumping/supervision

## 2021-01-12 NOTE — PROGRESS NOTE ADULT - SUBJECTIVE AND OBJECTIVE BOX
[place] [facility]                                                       [facility]                                                               Office: [address] [address]                                                              Telephone: [phone]. Fax:[phone]                                                                             PROGRESS NOTE  Reason for follow up: s/p ORIF  Overnight: No new events.   Update: 1/12: Pt denies chest pain, palpitations, SOB. Tele- NSR HR @ 60-70s. Pt s/p ORIF Left foot. Cont. current medication regimen.       Subjective: No new events     	  Vitals:  T(C): 36.7 (01-12-21 @ 07:47), Max: 37.2 (01-11-21 @ 23:01)  HR: 67 (01-12-21 @ 07:47) (65 - 105)  BP: 125/84 (01-12-21 @ 07:47) (108/65 - 169/106)  RR: 18 (01-12-21 @ 07:47) (12 - 20)  SpO2: 93% (01-12-21 @ 07:47) (90% - 99%)    I&O's Summary    Weight (kg): 99.8 (01-11 @ 18:07)      PHYSICAL EXAM:  Appearance: Comfortable. No acute distress  HEENT:  Head and neck: Atraumatic. Normocephalic.  Normal oral mucosa, PERRL, Neck is supple. No JVD, No carotid bruit.   Neurologic: A & O x 3, no focal deficits. EOMI.  Lymphatic: No cervical lymphadenopathy  Cardiovascular: Normal S1 S2, No murmur, rubs/gallops. No JVD, No edema  Respiratory: Lungs clear to auscultation  Gastrointestinal:  Soft, Non-tender, + BS  Lower Extremities: No edema  Psychiatry: Patient is calm. No agitation. Mood & affect appropriate  Skin: Left foot s/p ORIF DSD       CURRENT MEDICATIONS:  metoprolol succinate ER 25 milliGRAM(s) Oral daily        DIAGNOSTIC TESTING:  [ ] Echocardiogram: < from: TTE Echo Complete w/o Contrast w/ Doppler (01.11.21 @ 09:06) >  Summary:   1. Technically limited study.   2. Endocardial visualization was enhanced with intravenous echo contrast.   3. Left ventricular ejection fraction, by visual estimation, is 45 to 50% (Bi-plane EF 46%).   4. Mildly decreased global left ventricular systolic function.   5. Mildly enlarged right ventricle.   6. Mildly reduced RV systolic function.   7. Mild tricuspid regurgitation.   8. Trace mitral valve regurgitation.   9. Mild thickening of the anterior and posterior mitral valve leaflets.  10. Aortic valve is bicuspid.  11. Based on limited views there is mild dilatation of the aortic root (3.8 cm). The remainder of the aorta is not well visualized. Consider dedicated imaging ifclinically indicated.  12. There is no evidence of pericardial effusion.  13. Compared to a prior study from 6/22/16, there is improvement in left ventricular systolic function.          OTHER: 	  XRAY: < from: Xray Foot AP + Lateral + Oblique, Left (01.11.21 @ 21:54) >    FINDINGS:  Status post 2 screw fixation stabilizing Lisfranc fracture. Screws transfix the first cuneiform, base second metatarsal and first cuneiform and second cuneiform bones. Fracture segments are in proper anatomic alignment.  .    IMPRESSION:    Status post ORIF.      LABS:	 	                            14.6   7.18  )-----------( 200      ( 11 Jan 2021 10:45 )             41.6     01-11    135  |  99  |  13.0  ----------------------------<  114<H>  4.0   |  26.0  |  0.83    Ca    9.0      11 Jan 2021 10:45  Mg     2.0     01-11    TPro  7.0  /  Alb  4.0  /  TBili  1.2  /  DBili  x   /  AST  26  /  ALT  42<H>  /  AlkPhos  57  01-11          TELEMETRY: NSR HR @ 60-70s 	                                                                      [place] [facility]                                                       [facility]                                                               Office: [address] [address]                                                              Telephone: [phone]. Fax:[phone]                                                                             PROGRESS NOTE  Reason for follow up: s/p ORIF  Overnight: No new events.   Update: 1/12: Pt denies chest pain, palpitations, SOB. Tele- NSR HR @ 60-70s. Pt s/p ORIF Left foot. Pt likely hypercoagable d/t COVID infection. Plan for outpt 4 week event monitor and eventual outpt MAEGD. NO AC at this time d/t slight hemorrhagic conversion risks outweigh benefits at this time. Cont. Brilinta, ASA.        Subjective: No new events     	  Vitals:  T(C): 36.7 (01-12-21 @ 07:47), Max: 37.2 (01-11-21 @ 23:01)  HR: 67 (01-12-21 @ 07:47) (65 - 105)  BP: 125/84 (01-12-21 @ 07:47) (108/65 - 169/106)  RR: 18 (01-12-21 @ 07:47) (12 - 20)  SpO2: 93% (01-12-21 @ 07:47) (90% - 99%)    I&O's Summary    Weight (kg): 99.8 (01-11 @ 18:07)      PHYSICAL EXAM:  Appearance: Comfortable. No acute distress  HEENT:  Head and neck: Atraumatic. Normocephalic.  Normal oral mucosa, PERRL, Neck is supple. No JVD, No carotid bruit.   Neurologic: A & O x 3, no focal deficits. EOMI.  Lymphatic: No cervical lymphadenopathy  Cardiovascular: Normal S1 S2, No murmur, rubs/gallops. No JVD, No edema  Respiratory: Lungs clear to auscultation  Gastrointestinal:  Soft, Non-tender, + BS  Lower Extremities: No edema  Psychiatry: Patient is calm. No agitation. Mood & affect appropriate  Skin: Left foot s/p ORIF DSD       CURRENT MEDICATIONS:  metoprolol succinate ER 25 milliGRAM(s) Oral daily        DIAGNOSTIC TESTING:  [ ] Echocardiogram: < from: TTE Echo Complete w/o Contrast w/ Doppler (01.11.21 @ 09:06) >  Summary:   1. Technically limited study.   2. Endocardial visualization was enhanced with intravenous echo contrast.   3. Left ventricular ejection fraction, by visual estimation, is 45 to 50% (Bi-plane EF 46%).   4. Mildly decreased global left ventricular systolic function.   5. Mildly enlarged right ventricle.   6. Mildly reduced RV systolic function.   7. Mild tricuspid regurgitation.   8. Trace mitral valve regurgitation.   9. Mild thickening of the anterior and posterior mitral valve leaflets.  10. Aortic valve is bicuspid.  11. Based on limited views there is mild dilatation of the aortic root (3.8 cm). The remainder of the aorta is not well visualized. Consider dedicated imaging ifclinically indicated.  12. There is no evidence of pericardial effusion.  13. Compared to a prior study from 6/22/16, there is improvement in left ventricular systolic function.          OTHER: 	  XRAY: < from: Xray Foot AP + Lateral + Oblique, Left (01.11.21 @ 21:54) >    FINDINGS:  Status post 2 screw fixation stabilizing Lisfranc fracture. Screws transfix the first cuneiform, base second metatarsal and first cuneiform and second cuneiform bones. Fracture segments are in proper anatomic alignment.  .    IMPRESSION:    Status post ORIF.      LABS:	 	                            14.6   7.18  )-----------( 200      ( 11 Jan 2021 10:45 )             41.6     01-11    135  |  99  |  13.0  ----------------------------<  114<H>  4.0   |  26.0  |  0.83    Ca    9.0      11 Jan 2021 10:45  Mg     2.0     01-11    TPro  7.0  /  Alb  4.0  /  TBili  1.2  /  DBili  x   /  AST  26  /  ALT  42<H>  /  AlkPhos  57  01-11          TELEMETRY: NSR HR @ 60-70s 	                                                                      [place] [facility]                                                       [facility]                                                               Office: [address] [address]                                                              Telephone: [phone]. Fax:[phone]                                                                             PROGRESS NOTE  Reason for follow up: s/p ORIF  Overnight: No new events.   Update: 1/12: Pt denies chest pain, palpitations, SOB. Tele- NSR HR @ 60-70s. Pt s/p ORIF Left foot. Will sign off from cardiology perspective. Please feel free to contact if any changes.    Subjective: No new events     	  Vitals:  T(C): 36.7 (01-12-21 @ 07:47), Max: 37.2 (01-11-21 @ 23:01)  HR: 67 (01-12-21 @ 07:47) (65 - 105)  BP: 125/84 (01-12-21 @ 07:47) (108/65 - 169/106)  RR: 18 (01-12-21 @ 07:47) (12 - 20)  SpO2: 93% (01-12-21 @ 07:47) (90% - 99%)    I&O's Summary    Weight (kg): 99.8 (01-11 @ 18:07)      PHYSICAL EXAM:  Appearance: Comfortable. No acute distress  HEENT:  Head and neck: Atraumatic. Normocephalic.  Normal oral mucosa, PERRL, Neck is supple. No JVD, No carotid bruit.   Neurologic: A & O x 3, no focal deficits. EOMI.  Lymphatic: No cervical lymphadenopathy  Cardiovascular: Normal S1 S2, No murmur, rubs/gallops. No JVD, No edema  Respiratory: Lungs clear to auscultation  Gastrointestinal:  Soft, Non-tender, + BS  Lower Extremities: No edema  Psychiatry: Patient is calm. No agitation. Mood & affect appropriate  Skin: Left foot s/p ORIF DSD       CURRENT MEDICATIONS:  metoprolol succinate ER 25 milliGRAM(s) Oral daily        DIAGNOSTIC TESTING:  [ ] Echocardiogram: < from: TTE Echo Complete w/o Contrast w/ Doppler (01.11.21 @ 09:06) >  Summary:   1. Technically limited study.   2. Endocardial visualization was enhanced with intravenous echo contrast.   3. Left ventricular ejection fraction, by visual estimation, is 45 to 50% (Bi-plane EF 46%).   4. Mildly decreased global left ventricular systolic function.   5. Mildly enlarged right ventricle.   6. Mildly reduced RV systolic function.   7. Mild tricuspid regurgitation.   8. Trace mitral valve regurgitation.   9. Mild thickening of the anterior and posterior mitral valve leaflets.  10. Aortic valve is bicuspid.  11. Based on limited views there is mild dilatation of the aortic root (3.8 cm). The remainder of the aorta is not well visualized. Consider dedicated imaging ifclinically indicated.  12. There is no evidence of pericardial effusion.  13. Compared to a prior study from 6/22/16, there is improvement in left ventricular systolic function.          OTHER: 	  XRAY: < from: Xray Foot AP + Lateral + Oblique, Left (01.11.21 @ 21:54) >    FINDINGS:  Status post 2 screw fixation stabilizing Lisfranc fracture. Screws transfix the first cuneiform, base second metatarsal and first cuneiform and second cuneiform bones. Fracture segments are in proper anatomic alignment.  .    IMPRESSION:    Status post ORIF.      LABS:	 	                            14.6   7.18  )-----------( 200      ( 11 Jan 2021 10:45 )             41.6     01-11    135  |  99  |  13.0  ----------------------------<  114<H>  4.0   |  26.0  |  0.83    Ca    9.0      11 Jan 2021 10:45  Mg     2.0     01-11    TPro  7.0  /  Alb  4.0  /  TBili  1.2  /  DBili  x   /  AST  26  /  ALT  42<H>  /  AlkPhos  57  01-11          TELEMETRY: NSR HR @ 60-70s

## 2021-01-12 NOTE — PROGRESS NOTE ADULT - ATTENDING COMMENTS
Pre-operative cardiovascular risk evaluation and management. No cardiac symptoms. Non-ischemic ECG. METs > 4 RCRI (revised cardiac risk index score) < 1%. Johns perioperative cardiac risk score <1%. NSQIP score < 1%. No further cardiac work up is needed.  Benefits of surgery outweigh the risk. Further cardiac work up will not change risk of the patient. Proceed for surgery as indicated.      cardiomyopathy : s/p ICD. dual chamber. Smoker.   EF improved 45%
tolerated surgery well. no careadic complication.s  anticoagulation with eliqwuis on discharge or whenver ok by orthopedics.  f./u with cardiology as outpatient.

## 2021-01-12 NOTE — PHYSICAL THERAPY INITIAL EVALUATION ADULT - ADDITIONAL COMMENTS
per patient he owns crutches from a previous injury. Pt has 5-7 steps to enter with a handrail. Pt does not own a RW, will require one for home as he demonstrates he is unsafe with crutches at this time. Pt reports that his mother is elderly and will not be able to assist physically.

## 2021-01-12 NOTE — PROGRESS NOTE ADULT - SUBJECTIVE AND OBJECTIVE BOX
Patient is a 55y old  Male who presents with a chief complaint of foot fracture (10 Vitaliy 2021 12:46)      HPI:  Patient is a 56 y/o M with pmh of afib on eliquis s/p ppm, NICM, bicuspid aortic valve, obesity presents s/p fall. Patient states he was drinking alcohol during the football game and tripped and fell down the stairs. Patient denies any prior chest pain, palpitations or LOC. Patient presented to the ed and found to have multiple left foot fractures and podiatry was consulted. Patient seen at bedside and complains of pain but denies any other complaints (10 Vitaliy 2021 12:05)      Podiatry HPI: History as above. Seen bedside Hall compression and posterior splint in place. Patient is S/p ORIF left Lisfranc fracture 1/11/21. Admits to mild pain at the moment. Has kept foot elevated.      PMH:   GERD (gastroesophageal reflux disease)  CHELITA (obstructive sleep apnea)  Afib      Allergies: No Known Allergies    Medications:   acetaminophen   Tablet .. 650 milliGRAM(s) Oral every 6 hours PRN  morphine  - Injectable 4 milliGRAM(s) IV Push every 3 hours PRN  morphine  - Injectable 6 milliGRAM(s) IV Push every 3 hours PRN    FH: hypertension      PSX: S/P ablation of atrial fibrillation      SH:   Social History:  denies drugs  social drinker (10 Vitaliy 2021 12:05)    Vital Signs Last 24 Hrs  T(C): 36.7 (12 Jan 2021 07:47), Max: 37.2 (11 Jan 2021 23:01)  T(F): 98 (12 Jan 2021 07:47), Max: 98.9 (11 Jan 2021 23:01)  HR: 67 (12 Jan 2021 07:47) (65 - 105)  BP: 125/84 (12 Jan 2021 07:47) (108/65 - 169/106)  BP(mean): 119 (11 Jan 2021 21:45) (116 - 134)  RR: 18 (12 Jan 2021 07:47) (12 - 20)  SpO2: 93% (12 Jan 2021 07:47) (90% - 99%)    LABS                        14.6   7.18  )-----------( 200      ( 11 Jan 2021 10:45 )             41.6     WBC Count: 7.18 K/uL (01-11 @ 10:45)  WBC Count: 10.54 K/uL (01-10 @ 11:49)    01-11    135  |  99  |  13.0  ----------------------------<  114<H>  4.0   |  26.0  |  0.83    Ca    9.0      11 Jan 2021 10:45  Mg     2.0     01-11    TPro  7.0  /  Alb  4.0  /  TBili  1.2  /  DBili  x   /  AST  26  /  ALT  42<H>  /  AlkPhos  57  01-11    PHYSICAL EXAM  GEN: WASHINGTON RICHMOND is a pleasant well-nourished, well developed 55y Male in no acute distress, alert awake, and oriented to person, place and time.   LE Focused:  Posterior splint left in place  Vasc:  DP pulses +2/4 b/l.  CFT brisk to digits. toes warm to touch.    Derm: No open lesions.   Ecchymosis to plantar forefoot and toes    Neuro: Protective sensation intact.  MSK: Diffuse pain on palpation of forefoot, midfoot, and rearfoot secondary to multiple fractures.  Patient can actively move digits.  No pain with ROM of digits 2-4.  Achilles tendon intact.      Imaging:       `< from: CT Foot No Cont, Left (01.10.21 @ 09:35) >   EXAM:  CT FOOT ONLY LT                        PROCEDURE DATE:  01/10/2021    INTERPRETATION:  EXAMINATION: CT left foot without contrast    CLINICAL INFORMATION: Severe left foot pain    TECHNIQUE: Axial CT images were obtained through theleft foot. Coronal and sagittal reformatted images were made. 3-D reconstruction images were also performed.    FINDINGS: There are multiple acute fractures at the midfoot. This includes: Comminuted minimally displaced fracture at the lateral aspect of the medial cuneiform at the proximal attachment of the Lisfranc ligament with extension to the distal articular surface of the medial cuneiform. Comminuted impacted fracture of the lateral aspect of the base of the first metatarsal. Obliquely oriented minimally displaced and comminuted fracture of the base of the second metatarsal extending to its proximal shaft. Minimally displaced avulsion fracture at the distal dorsal aspect of the lateral cuneiform. Mildly comminuted obliquely oriented fracture of the dorsal lateral aspect of the cuboid with up to 0.5 cm of dorsal lateral displacement of the dominant dorsal fracture fragment. Displaced at the medial aspect of the base of the fourth metatarsal.    There is also a mildly displaced avulsion fracture of the anterior aspect of the distal fibula at the attachment of the anterior inferior tibiofibular ligament and there is a nondisplaced coronally oriented fracture of the posterior malleolus.    There is dorsal subcutaneous soft tissue edema and lateral subcutaneous edema at the ankle. There are no advanced arthritic changes.    The 3-D reconstructed images confirm the above fractures.    IMPRESSION: Multiple acute fractures at the midfoot including fractures of the medial cuneiform, lateral cuneiform, cuboid, and bases of the first, second, and fourth metatarsals, as above.    Nondisplaced coronally oriented fracture of the posterior malleolus. Cortical avulsion fracture of the anterior aspect of the distal fibula.  WASHINGTON MATA MD; Attending Radiologist  This document has been electronically signed. Vitaliy 10 2021 10:10AM    < end of copied text >          INTERPRETATION:  Clinical Information: Left foot pain, unable to bear weight    Technique: 3 views left foot. 3 views left ankle.    Comparison: None    Findings/  Impression: There is no acute fracture or dislocation. Mild cortical irregularity involving the medial aspect of the first MTP joint, likely mild arthropathy. Severe dorsal foot soft tissue swelling. Consider cross-sectional imaging if symptoms persist.            RASHI HORTON MD; Attending Interventional Radiologist  This document has been electronically signed. Vitaliy 10 2021  8:35AM    < end of copied text >  --------------------------------------------------------------------------------------        A:   Left Lisfranc fracture dislocation  left cuboid fracture    P:  Patient evaluated, chart reviewed  X-rays reviewed  CT reviewed- Lisfranc fracture, cuboid fracture,   Hall and posterior splint left intact   Splint to remain clean, dry and intact  Continue left leg elevation  NWB to left foot in crutches - PT eval  Pain management per primary team  Stable for discharge from podiatry standpoint  To follow up with Dr Norwood within a week   Seen with Attending Dr. Garland

## 2021-01-12 NOTE — PROGRESS NOTE ADULT - ASSESSMENT
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 Has not followed w/Dr. Khan in 4+yrs. PMD Dr. Abebe. Reports good functional capacity, goes to gym. Patient presented to the ED c/o left foot pain s/p mechanical fall down stairs last night while drinking during football game, denies LOC or head trauma.  Per note has multiple foot fractures requiring surgical intervention. Denies exertional chest pain/pressure, palpitations, irregular and/or rapid heart beat, SOB, VASQUEZ, syncope/near syncope, dizziness, orthopnea, PND, cough, edema, f/c, n/v/d, hematuria, or hematochezia.     1/12: Pt denies chest pain, palpitations, SOB. Tele- NSR HR @ 60-70s. Pt s/p ORIF Left foot. Cont. current medication regimen.       NICM, chronic systolic HFrecEF  -TTE 1/10: EF 45-50%. bicuspid aortic valve (known). mild dilation of aortic root 3.8cm (not well visualized). no RWMA or significant valvulopathy  -euvolemic on exam    Hx pAFib/AFlutter, Tachy Roslyn syndrome  -Tele-NSR HR 60-70s  -s/p MDT Dual chamber avisa placed 6/27/16  -on Eliquis, may hold for surgical procedure and resume as soon as deemed safe by surgical team    Dilated aortic root  -outpatient vascular f/u    S/P ORIF Lislanc Fracture  -patient states his pain is controlled  -incentive spirometry use and ambulation encouraged    Smoking cessation  -Patient was counselled on tobacco cessation. They were informed of the increased risk of lung cancer and cardiovascular disease, COPD among other health risks associated with smoking and tobacco use. Cessation was advised. Patient will consider smoking cessation.       Assessment and recommendations are final when note is signed by the attending.   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 Has not followed w/Dr. Khan in 4+yrs. PMD Dr. Abebe. Reports good functional capacity, goes to gym. Patient presented to the ED c/o left foot pain s/p mechanical fall down stairs last night while drinking during football game, denies LOC or head trauma.  Per note has multiple foot fractures requiring surgical intervention. Denies exertional chest pain/pressure, palpitations, irregular and/or rapid heart beat, SOB, VASQUEZ, syncope/near syncope, dizziness, orthopnea, PND, cough, edema, f/c, n/v/d, hematuria, or hematochezia.     1/12: Pt denies chest pain, palpitations, SOB. Tele- NSR HR @ 60-70s. Pt s/p ORIF Left foot. Pt likely hypercoagable d/t COVID infection. Plan for outpt 4 week event monitor and eventual outpt MAGED. NO AC at this time d/t slight hemorrhagic conversion risks outweigh benefits at this time. Cont. Brilinta, ASA.       NICM, chronic systolic HFrecEF  -TTE 1/10: EF 45-50%. bicuspid aortic valve (known). mild dilation of aortic root 3.8cm (not well visualized). no RWMA or significant valvulopathy  -euvolemic on exam    Hx pAFib/AFlutter, Tachy Roslyn syndrome  -Tele-NSR HR 60-70s  -s/p MDT Dual chamber avisa placed 6/27/16  -on Eliquis, may hold for surgical procedure and resume as soon as deemed safe by surgical team    Dilated aortic root  -outpatient vascular f/u    S/P ORIF Lislanc Fracture  -patient states his pain is controlled  -incentive spirometry use and ambulation encouraged    Smoking cessation  -Patient was counselled on tobacco cessation. They were informed of the increased risk of lung cancer and cardiovascular disease, COPD among other health risks associated with smoking and tobacco use. Cessation was advised. Patient will consider smoking cessation.       Assessment and recommendations are final when note is signed by the attending.   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 Has not followed w/Dr. Khan in 4+yrs. PMD Dr. Abebe. Reports good functional capacity, goes to gym. Patient presented to the ED c/o left foot pain s/p mechanical fall down stairs last night while drinking during football game, denies LOC or head trauma.  Per note has multiple foot fractures requiring surgical intervention. Denies exertional chest pain/pressure, palpitations, irregular and/or rapid heart beat, SOB, VASQUEZ, syncope/near syncope, dizziness, orthopnea, PND, cough, edema, f/c, n/v/d, hematuria, or hematochezia.     1/12: Pt denies chest pain, palpitations, SOB. Tele- NSR HR @ 60-70s. Pt s/p ORIF Left foot. Cont. current medication regimen. Will sign off from cardiology perspective. Please feel free to contact if any changes.       NICM, chronic systolic HFrecEF  -TTE 1/10: EF 45-50%. bicuspid aortic valve (known). mild dilation of aortic root 3.8cm (not well visualized). no RWMA or significant valvulopathy  -euvolemic on exam    Hx pAFib/AFlutter, Tachy Roslyn syndrome  -Tele-NSR HR 60-70s  -s/p MDT Dual chamber avisa placed 6/27/16  -on Eliquis, may hold for surgical procedure and resume as soon as deemed safe by surgical team    Dilated aortic root  -outpatient vascular f/u    S/P ORIF Lislanc Fracture  -patient states his pain is controlled  -incentive spirometry use and ambulation encouraged    Smoking cessation  -Patient was counselled on tobacco cessation. They were informed of the increased risk of lung cancer and cardiovascular disease, COPD among other health risks associated with smoking and tobacco use. Cessation was advised. Patient will consider smoking cessation.       Assessment and recommendations are final when note is signed by the attending.     Will sign off from cardiology perspective. Please feel free to contact if any changes

## 2021-01-13 ENCOUNTER — TRANSCRIPTION ENCOUNTER (OUTPATIENT)
Age: 56
End: 2021-01-13

## 2021-01-13 VITALS
SYSTOLIC BLOOD PRESSURE: 138 MMHG | DIASTOLIC BLOOD PRESSURE: 77 MMHG | HEART RATE: 84 BPM | RESPIRATION RATE: 18 BRPM | OXYGEN SATURATION: 94 % | TEMPERATURE: 98 F

## 2021-01-13 PROCEDURE — 99239 HOSP IP/OBS DSCHRG MGMT >30: CPT

## 2021-01-13 RX ORDER — LANOLIN ALCOHOL/MO/W.PET/CERES
5 CREAM (GRAM) TOPICAL AT BEDTIME
Refills: 0 | Status: DISCONTINUED | OUTPATIENT
Start: 2021-01-13 | End: 2021-01-13

## 2021-01-13 RX ORDER — CEPHALEXIN 500 MG
1 CAPSULE ORAL
Qty: 10 | Refills: 0
Start: 2021-01-13 | End: 2021-01-17

## 2021-01-13 NOTE — DISCHARGE NOTE PROVIDER - NSDCCPCAREPLAN_GEN_ALL_CORE_FT
PRINCIPAL DISCHARGE DIAGNOSIS  Diagnosis: Closed nondisplaced fracture of cuboid of left foot, initial encounter  Assessment and Plan of Treatment: s/p ORIF  Non weight bearing to left foot with crutches    Start Keflex 500mg BID x 5 days per podiatry recs  f/u w/ Dr. Norwood within 1 week      SECONDARY DISCHARGE DIAGNOSES  Diagnosis: Closed nondisplaced fracture of first metatarsal bone of left foot, initial encounter  Assessment and Plan of Treatment:     Diagnosis: Fracture of tibia and fibula, left, closed, initial encounter  Assessment and Plan of Treatment:

## 2021-01-13 NOTE — DISCHARGE NOTE PROVIDER - CARE PROVIDER_API CALL
Amador Norwood (ELVERM)  Surgery  [address] [place] [address]  Phone: [phone]  Fax: [phone]  Follow Up Time: 1 week

## 2021-01-13 NOTE — DISCHARGE NOTE NURSING/CASE MANAGEMENT/SOCIAL WORK - PATIENT PORTAL LINK FT
You can access the FollowMyHealth Patient Portal offered by [facility] by registering at the following website: [email or web address]. By joining [facility]’s FollowMyHealth portal, you will also be able to view your health information using other applications (apps) compatible with our system.

## 2021-01-13 NOTE — DISCHARGE NOTE PROVIDER - HOSPITAL COURSE
56 y/o male with a PMH of AFIB (on eliquis s/p PPM), HFrEF (EF 45-50%), tachy/roslyn syndrome hx, HLD, CHELITA, obesity presents to the hospital s/p fall.  Workup revealed pt had multiple left foot fractures and podiatry was consulted.  Patient is S/P ORIF left Lisfranc fracture done on 1/11/21 and now has a compression and posterior splint in place.  Procedure was tolerated well and without complications.  Pt was instructed by podiatry to be NWB to left foot in crutches and cleared for discharge home.  Pt is medically stable for discharge and will f/u w/ Dr. Norwood within a week.  Pts questions were answered and he expressed understanding.            Physcial Exam  Vital Signs Last 24 Hrs  T(C): 36.8 (13 Jan 2021 04:56), Max: 36.8 (12 Jan 2021 19:02)  T(F): 98.2 (13 Jan 2021 04:56), Max: 98.3 (13 Jan 2021 00:14)  HR: 103 (13 Jan 2021 04:56) (70 - 103)  BP: 131/81 (13 Jan 2021 04:56) (114/72 - 131/81)  BP(mean): --  RR: 18 (13 Jan 2021 04:56) (18 - 18)  SpO2: 95% (13 Jan 2021 04:56) (94% - 96%)    GENERAL: pt examined bedside, laying comfortably in bed in NAD  HEENT: NC/AT, moist oral mucosa, clear conjunctiva, sclera nonicteric, EOMI  RESPIRATORY: Normal respiratory effort; CTA b/l, no wheezing, rhonchi, rales  CARDIOVASCULAR: RRR, normal S1 and S2, no murmur/rub/gallop  ABDOMEN: soft, obese abdomen, NT/ND, normoactive bowel sounds, no rebound/guarding, no HSM  MUSCLOSKELETAL:  Left foot wrapped in compression dressing/spint   EXTREMITIES: No cyanosis, no clubbing, +Left foot edema, peripheral pulses are 2+ bilaterally, toes mobile  NEUROLOGY: A+O to person, place, and time, no focal neurologic deficits appreciated   SKIN: No rashes or no palpable lesions        Time spent on discharge 42 minutes.

## 2021-01-13 NOTE — DISCHARGE NOTE PROVIDER - NSDCMRMEDTOKEN_GEN_ALL_CORE_FT
acetaminophen 325 mg oral tablet: 2 tab(s) orally every 6 hours, As needed, Mild Pain  Eliquis 5 mg oral tablet: 1 tab(s) orally 2 times a day  Keflex 500 mg oral capsule: 1 cap(s) orally 2 times a day   pantoprazole 40 mg oral delayed release tablet: 1 tab(s) orally once a day (before a meal)  spironolactone 25 mg oral tablet: 1 tab(s) orally once a day

## 2021-01-13 NOTE — CHART NOTE - NSCHARTNOTEFT_GEN_A_CORE
Event note     called by RN for patient with increased agitation requesting to leave AMA     pt seen/examined in the hallway, wearing hospital gown sitting in chair, Pt A&Ox3, aggitated and with bizarre behavior. Pt states "something shady is going on in that room(pointing to his room) and I just want my things and want to go home".  Respectfully inquired about daily ETOH use as I suppose his behaviors may be related to ETOH withdrawal. However patient denies frequent alcohol consumption. He states he only drinks a few beer on Sundays.  After de-escalation and speaking with attending, explained to patient the risks of leaving hospital AMA including but not limited to compartment syndrome, secondary fall, infection, failure of insurance reimbursement or even death. After the conclusion of the conversation, patient agreed to stay until tomorrow and speak to his surgeon.       T(F): 98.3 (01-13-21 @ 00:14)  HR: 102 (01-13-21 @ 00:14)  BP: 128/87 (01-13-21 @ 00:14)  RR: 18 (01-13-21 @ 00:14)  SpO2: 96% (01-13-21 @ 00:14)    PHYSICAL EXAM:   Neurological: AAOx3, CNII-XII intact,  strength 5/5 b/l  Cardiovascular: RRR  Respiratory: CTA  Gastrointestinal: soft, NT, ND, BS+  Extremities: warm, WWP splint to left ankle and foot with ace wrap intact no drainage. neurovascular intact   Vascular: no cyanosis/erythema  LABS:    01-11    135  |  99  |  13.0  ----------------------------<  114<H>  4.0   |  26.0  |  0.83    Ca    9.0      11 Jan 2021 10:45  Mg     2.0     01-11    TPro  7.0  /  Alb  4.0  /  TBili  1.2  /  DBili  x   /  AST  26  /  ALT  42<H>  /  AlkPhos  57  01-11  LIVER FUNCTIONS - ( 11 Jan 2021 10:45 )  Alb: 4.0 g/dL / Pro: 7.0 g/dL / ALK PHOS: 57 U/L / ALT: 42 U/L / AST: 26 U/L / GGT: x                               14.6   7.18  )-----------( 200      ( 11 Jan 2021 10:45 )             41.6     CAPILLARY BLOOD GLUCOSE          A/P:  Aggitation S/P ORIF left Lisfranc fracture done on 1/11/2.   *melatonin 5mg PO x 1 now   *ativan 0.5mg po prn agitation x 1 now  *continue CCM  and VS monitoring   *SBIRT counseling             above event discussed with Attg

## 2021-01-21 PROCEDURE — 70450 CT HEAD/BRAIN W/O DYE: CPT

## 2021-01-21 PROCEDURE — C1713: CPT

## 2021-01-21 PROCEDURE — 86901 BLOOD TYPING SEROLOGIC RH(D): CPT

## 2021-01-21 PROCEDURE — 93306 TTE W/DOPPLER COMPLETE: CPT

## 2021-01-21 PROCEDURE — 99285 EMERGENCY DEPT VISIT HI MDM: CPT | Mod: 25

## 2021-01-21 PROCEDURE — 71046 X-RAY EXAM CHEST 2 VIEWS: CPT

## 2021-01-21 PROCEDURE — 73630 X-RAY EXAM OF FOOT: CPT

## 2021-01-21 PROCEDURE — 86769 SARS-COV-2 COVID-19 ANTIBODY: CPT

## 2021-01-21 PROCEDURE — U0005: CPT

## 2021-01-21 PROCEDURE — 85730 THROMBOPLASTIN TIME PARTIAL: CPT

## 2021-01-21 PROCEDURE — 85610 PROTHROMBIN TIME: CPT

## 2021-01-21 PROCEDURE — 73610 X-RAY EXAM OF ANKLE: CPT

## 2021-01-21 PROCEDURE — 73700 CT LOWER EXTREMITY W/O DYE: CPT

## 2021-01-21 PROCEDURE — 93005 ELECTROCARDIOGRAM TRACING: CPT

## 2021-01-21 PROCEDURE — 80053 COMPREHEN METABOLIC PANEL: CPT

## 2021-01-21 PROCEDURE — 36415 COLL VENOUS BLD VENIPUNCTURE: CPT

## 2021-01-21 PROCEDURE — 86900 BLOOD TYPING SEROLOGIC ABO: CPT

## 2021-01-21 PROCEDURE — 86850 RBC ANTIBODY SCREEN: CPT

## 2021-01-21 PROCEDURE — 96374 THER/PROPH/DIAG INJ IV PUSH: CPT

## 2021-01-21 PROCEDURE — 85025 COMPLETE CBC W/AUTO DIFF WBC: CPT

## 2021-01-21 PROCEDURE — U0003: CPT

## 2021-01-21 PROCEDURE — 83735 ASSAY OF MAGNESIUM: CPT

## 2021-01-21 PROCEDURE — 97163 PT EVAL HIGH COMPLEX 45 MIN: CPT

## 2021-05-25 ENCOUNTER — FORM ENCOUNTER (OUTPATIENT)
Age: 56
End: 2021-05-25

## 2021-06-03 ENCOUNTER — NON-APPOINTMENT (OUTPATIENT)
Age: 56
End: 2021-06-03

## 2021-06-09 PROBLEM — G47.33 OBSTRUCTIVE SLEEP APNEA (ADULT) (PEDIATRIC): Chronic | Status: ACTIVE | Noted: 2021-01-10

## 2021-06-09 PROBLEM — K21.9 GASTRO-ESOPHAGEAL REFLUX DISEASE WITHOUT ESOPHAGITIS: Chronic | Status: ACTIVE | Noted: 2021-01-10

## 2021-06-15 ENCOUNTER — APPOINTMENT (OUTPATIENT)
Age: 56
End: 2021-06-15
Payer: MEDICARE

## 2021-06-15 ENCOUNTER — OUTPATIENT (OUTPATIENT)
Dept: OUTPATIENT SERVICES | Facility: HOSPITAL | Age: 56
LOS: 1 days | End: 2021-06-15
Payer: MEDICARE

## 2021-06-15 DIAGNOSIS — Z98.89 OTHER SPECIFIED POSTPROCEDURAL STATES: Chronic | ICD-10-CM

## 2021-06-15 DIAGNOSIS — G47.33 OBSTRUCTIVE SLEEP APNEA (ADULT) (PEDIATRIC): ICD-10-CM

## 2021-06-15 PROCEDURE — 95810 POLYSOM 6/> YRS 4/> PARAM: CPT

## 2021-06-15 PROCEDURE — 95810 POLYSOM 6/> YRS 4/> PARAM: CPT | Mod: 26

## 2021-07-13 ENCOUNTER — APPOINTMENT (OUTPATIENT)
Dept: PULMONOLOGY | Facility: CLINIC | Age: 56
End: 2021-07-13
Payer: MEDICARE

## 2021-07-13 VITALS — OXYGEN SATURATION: 96 % | HEART RATE: 130 BPM

## 2021-07-13 VITALS — SYSTOLIC BLOOD PRESSURE: 110 MMHG | DIASTOLIC BLOOD PRESSURE: 60 MMHG

## 2021-07-13 VITALS — BODY MASS INDEX: 35.2 KG/M2 | WEIGHT: 219 LBS | HEIGHT: 66 IN

## 2021-07-13 DIAGNOSIS — I48.0 PAROXYSMAL ATRIAL FIBRILLATION: ICD-10-CM

## 2021-07-13 DIAGNOSIS — E78.5 HYPERLIPIDEMIA, UNSPECIFIED: ICD-10-CM

## 2021-07-13 DIAGNOSIS — Z95.0 PRESENCE OF CARDIAC PACEMAKER: ICD-10-CM

## 2021-07-13 DIAGNOSIS — G47.33 OBSTRUCTIVE SLEEP APNEA (ADULT) (PEDIATRIC): ICD-10-CM

## 2021-07-13 DIAGNOSIS — I50.22 CHRONIC SYSTOLIC (CONGESTIVE) HEART FAILURE: ICD-10-CM

## 2021-07-13 DIAGNOSIS — Z87.891 PERSONAL HISTORY OF NICOTINE DEPENDENCE: ICD-10-CM

## 2021-07-13 DIAGNOSIS — R06.2 WHEEZING: ICD-10-CM

## 2021-07-13 DIAGNOSIS — K21.9 GASTRO-ESOPHAGEAL REFLUX DISEASE W/OUT ESOPHAGITIS: ICD-10-CM

## 2021-07-13 DIAGNOSIS — F17.200 NICOTINE DEPENDENCE, UNSPECIFIED, UNCOMPLICATED: ICD-10-CM

## 2021-07-13 DIAGNOSIS — G47.31 PRIMARY CENTRAL SLEEP APNEA: ICD-10-CM

## 2021-07-13 PROCEDURE — 99406 BEHAV CHNG SMOKING 3-10 MIN: CPT

## 2021-07-13 PROCEDURE — 99204 OFFICE O/P NEW MOD 45 MIN: CPT | Mod: 25

## 2021-08-10 ENCOUNTER — APPOINTMENT (OUTPATIENT)
Age: 56
End: 2021-08-10
Payer: MEDICARE

## 2021-08-10 ENCOUNTER — OUTPATIENT (OUTPATIENT)
Dept: OUTPATIENT SERVICES | Facility: HOSPITAL | Age: 56
LOS: 1 days | End: 2021-08-10
Payer: MEDICARE

## 2021-08-10 DIAGNOSIS — Z98.89 OTHER SPECIFIED POSTPROCEDURAL STATES: Chronic | ICD-10-CM

## 2021-08-10 DIAGNOSIS — G47.33 OBSTRUCTIVE SLEEP APNEA (ADULT) (PEDIATRIC): ICD-10-CM

## 2021-08-10 PROCEDURE — 95811 POLYSOM 6/>YRS CPAP 4/> PARM: CPT | Mod: 26

## 2021-08-10 PROCEDURE — 95811 POLYSOM 6/>YRS CPAP 4/> PARM: CPT

## 2025-02-19 NOTE — DISCHARGE NOTE NURSING/CASE MANAGEMENT/SOCIAL WORK - NSSCNAMETXT_GEN_ALL_CORE
Will need prove of immunizations before volunteering at a [facility]  Screening examination for pulmonary tuberculosis, measles, mumps and rubella antibodies ordered  Will administer DTaP vaccine (Boostrix) today 02/19/2025  Will complete the form once those lab results are made available     [facility]